# Patient Record
Sex: FEMALE | Race: BLACK OR AFRICAN AMERICAN | NOT HISPANIC OR LATINO | Employment: UNEMPLOYED | URBAN - METROPOLITAN AREA
[De-identification: names, ages, dates, MRNs, and addresses within clinical notes are randomized per-mention and may not be internally consistent; named-entity substitution may affect disease eponyms.]

---

## 2023-02-22 ENCOUNTER — HOSPITAL ENCOUNTER (EMERGENCY)
Facility: HOSPITAL | Age: 28
Discharge: HOME | End: 2023-02-22
Attending: EMERGENCY MEDICINE
Payer: MEDICAID

## 2023-02-22 VITALS
TEMPERATURE: 98 F | BODY MASS INDEX: 29.41 KG/M2 | HEIGHT: 67 IN | WEIGHT: 187.38 LBS | SYSTOLIC BLOOD PRESSURE: 129 MMHG | HEART RATE: 70 BPM | RESPIRATION RATE: 18 BRPM | OXYGEN SATURATION: 100 % | DIASTOLIC BLOOD PRESSURE: 72 MMHG

## 2023-02-22 DIAGNOSIS — R51.9 ACUTE NONINTRACTABLE HEADACHE, UNSPECIFIED HEADACHE TYPE: Primary | ICD-10-CM

## 2023-02-22 LAB
B-HCG UR QL: NEGATIVE
CTP QC/QA: YES

## 2023-02-22 PROCEDURE — 81025 URINE PREGNANCY TEST: CPT | Performed by: EMERGENCY MEDICINE

## 2023-02-22 PROCEDURE — 99282 EMERGENCY DEPT VISIT SF MDM: CPT

## 2023-02-22 PROCEDURE — 25000003 PHARM REV CODE 250: Performed by: PHYSICIAN ASSISTANT

## 2023-02-22 RX ORDER — ACETAMINOPHEN 500 MG
1000 TABLET ORAL
Status: COMPLETED | OUTPATIENT
Start: 2023-02-22 | End: 2023-02-22

## 2023-02-22 RX ADMIN — ACETAMINOPHEN 1000 MG: 500 TABLET, FILM COATED ORAL at 08:02

## 2023-02-23 NOTE — ED PROVIDER NOTES
Encounter Date: 2/22/2023       History     Chief Complaint   Patient presents with    Headache     Pt reports frontal HA x 3 days. Denies N/V, blurry vision. Denies taking anything for pain.      27-year-old female presents to ED complaining of 3 day history of frontal headache.  Headache wraps around to her posterior parietal scalp bilaterally.  She admits to associated eye pressure.  She denies history of similar headaches.  Denies frequent headaches.  No trauma.  No fever.  No recent illness.  No cough.  No lightheadedness.  No weakness.  No nausea vomiting.  No neck pain or stiffness.  Symptoms are acute, constant, mild.  No medications taken prior to arrival.  No exacerbating factors.    Denies any significant past medical history    Review of patient's allergies indicates:  No Known Allergies  No past medical history on file.  No past surgical history on file.  No family history on file.     Review of Systems   Constitutional:  Negative for fever.   Eyes:  Negative for photophobia and visual disturbance.   Respiratory:  Negative for cough.    Gastrointestinal:  Negative for nausea and vomiting.   Musculoskeletal:  Negative for neck pain and neck stiffness.   Neurological:  Positive for headaches. Negative for syncope, weakness and light-headedness.     Physical Exam     Initial Vitals [02/22/23 1805]   BP Pulse Resp Temp SpO2   129/80 74 18 98.5 °F (36.9 °C) 100 %      MAP       --         Physical Exam    Nursing note and vitals reviewed.  Constitutional: She appears well-developed and well-nourished. She is not diaphoretic. No distress.   HENT:   Head: Normocephalic and atraumatic.   Eyes: Conjunctivae and EOM are normal. Pupils are equal, round, and reactive to light.   Neck: Neck supple.   Normal range of motion.  Pulmonary/Chest: No respiratory distress.   Musculoskeletal:      Cervical back: Normal range of motion and neck supple.     Neurological: She is alert and oriented to person, place, and time.  GCS score is 15. GCS eye subscore is 4. GCS verbal subscore is 5. GCS motor subscore is 6.   Skin: Skin is warm. Capillary refill takes less than 2 seconds.   Psychiatric: She has a normal mood and affect. Thought content normal.       ED Course   Procedures  Labs Reviewed   POCT URINE PREGNANCY          Imaging Results    None          Medications   acetaminophen tablet 1,000 mg (has no administration in time range)     Medical Decision Making:   Differential Diagnosis:   Headache disorder, migraine, viral illness  Clinical Tests:   Lab Tests: Ordered and Reviewed  ED Management:  Patient does not have any nuchal rigidity.  Patient is nontoxic appearing, does not have a fever. There is no meningismus. Headache is focused over her forehead, wraps around to bilateral posterior parietal scalp.  Patient does not have any vision problems or neurologic deficits by history or by physical exam.  I do not suspect acute life-threatening process today.  My goal is to get patient's headache under control and have follow up with a local primary care physician for further evaluation and management.  Patient has not taken any medications for her headache. I have given her Tylenol. I will d/c her with instructions to continue the same prn.  I believe the rest for management can be followed up as an outpatient with PCP. Patient ambulated without difficulty out of the emergency department.She was advised to return for any new or worsening symptoms such as fever, neck stiffness, confusion, double vision, or loss of balance, etc.                         Clinical Impression:   Final diagnoses:  [R51.9] Acute nonintractable headache, unspecified headache type (Primary)        ED Disposition Condition    Discharge Stable          ED Prescriptions    None       Follow-up Information       Follow up With Specialties Details Why Contact Info    [facility]  Schedule an appointment as soon as possible for a visit  To  establish primary care physician, for reevaluation [address]  [place] [address]  [phone]               Zaki Dudley PA-C  02/22/23 1952

## 2023-02-23 NOTE — ED TRIAGE NOTES
Pt presents to ED c/o headache x 3 days.  Denies any other symptoms.  Denies taking medications for symptoms.

## 2023-06-07 ENCOUNTER — HOSPITAL ENCOUNTER (EMERGENCY)
Facility: HOSPITAL | Age: 28
Discharge: HOME | End: 2023-06-07
Attending: EMERGENCY MEDICINE
Payer: MEDICAID

## 2023-06-07 VITALS
HEART RATE: 80 BPM | WEIGHT: 187 LBS | SYSTOLIC BLOOD PRESSURE: 123 MMHG | TEMPERATURE: 99 F | DIASTOLIC BLOOD PRESSURE: 57 MMHG | BODY MASS INDEX: 31.16 KG/M2 | RESPIRATION RATE: 18 BRPM | OXYGEN SATURATION: 100 % | HEIGHT: 65 IN

## 2023-06-07 DIAGNOSIS — Z3A.01 LESS THAN 8 WEEKS GESTATION OF PREGNANCY: ICD-10-CM

## 2023-06-07 DIAGNOSIS — O26.891 ABDOMINAL PAIN DURING PREGNANCY IN FIRST TRIMESTER: Primary | ICD-10-CM

## 2023-06-07 DIAGNOSIS — R10.9 ABDOMINAL PAIN DURING PREGNANCY IN FIRST TRIMESTER: Primary | ICD-10-CM

## 2023-06-07 LAB
ABO + RH BLD: NORMAL
ALBUMIN SERPL BCP-MCNC: 4.4 G/DL (ref 3.5–5.2)
ALP SERPL-CCNC: 96 U/L (ref 55–135)
ALT SERPL W/O P-5'-P-CCNC: 14 U/L (ref 10–44)
ANION GAP SERPL CALC-SCNC: 10 MMOL/L (ref 8–16)
AST SERPL-CCNC: 18 U/L (ref 10–40)
B-HCG UR QL: POSITIVE
BASOPHILS # BLD AUTO: 0.03 K/UL (ref 0–0.2)
BASOPHILS NFR BLD: 0.4 % (ref 0–1.9)
BILIRUB SERPL-MCNC: 0.1 MG/DL (ref 0.1–1)
BILIRUB UR QL STRIP: NEGATIVE
BUN SERPL-MCNC: 11 MG/DL (ref 6–20)
CALCIUM SERPL-MCNC: 10.1 MG/DL (ref 8.7–10.5)
CHLORIDE SERPL-SCNC: 102 MMOL/L (ref 95–110)
CLARITY UR: CLEAR
CO2 SERPL-SCNC: 23 MMOL/L (ref 23–29)
COLOR UR: COLORLESS
CREAT SERPL-MCNC: 0.8 MG/DL (ref 0.5–1.4)
CTP QC/QA: YES
DIFFERENTIAL METHOD: ABNORMAL
EOSINOPHIL # BLD AUTO: 0.1 K/UL (ref 0–0.5)
EOSINOPHIL NFR BLD: 0.7 % (ref 0–8)
ERYTHROCYTE [DISTWIDTH] IN BLOOD BY AUTOMATED COUNT: 13.4 % (ref 11.5–14.5)
EST. GFR  (NO RACE VARIABLE): >60 ML/MIN/1.73 M^2
GLUCOSE SERPL-MCNC: 80 MG/DL (ref 70–110)
GLUCOSE UR QL STRIP: NEGATIVE
HCG INTACT+B SERPL-ACNC: 3746 MIU/ML
HCT VFR BLD AUTO: 38.8 % (ref 37–48.5)
HGB BLD-MCNC: 13.1 G/DL (ref 12–16)
HGB UR QL STRIP: NEGATIVE
IMM GRANULOCYTES # BLD AUTO: 0.02 K/UL (ref 0–0.04)
IMM GRANULOCYTES NFR BLD AUTO: 0.2 % (ref 0–0.5)
KETONES UR QL STRIP: NEGATIVE
LEUKOCYTE ESTERASE UR QL STRIP: NEGATIVE
LYMPHOCYTES # BLD AUTO: 3 K/UL (ref 1–4.8)
LYMPHOCYTES NFR BLD: 37.4 % (ref 18–48)
MCH RBC QN AUTO: 26.7 PG (ref 27–31)
MCHC RBC AUTO-ENTMCNC: 33.8 G/DL (ref 32–36)
MCV RBC AUTO: 79 FL (ref 82–98)
MONOCYTES # BLD AUTO: 0.6 K/UL (ref 0.3–1)
MONOCYTES NFR BLD: 7.5 % (ref 4–15)
NEUTROPHILS # BLD AUTO: 4.4 K/UL (ref 1.8–7.7)
NEUTROPHILS NFR BLD: 53.8 % (ref 38–73)
NITRITE UR QL STRIP: NEGATIVE
NRBC BLD-RTO: 0 /100 WBC
PH UR STRIP: 6 [PH] (ref 5–8)
PLATELET # BLD AUTO: 234 K/UL (ref 150–450)
PMV BLD AUTO: 10.2 FL (ref 9.2–12.9)
POTASSIUM SERPL-SCNC: 4 MMOL/L (ref 3.5–5.1)
PROT SERPL-MCNC: 8.6 G/DL (ref 6–8.4)
PROT UR QL STRIP: NEGATIVE
RBC # BLD AUTO: 4.9 M/UL (ref 4–5.4)
SODIUM SERPL-SCNC: 135 MMOL/L (ref 136–145)
SP GR UR STRIP: 1.01 (ref 1–1.03)
URN SPEC COLLECT METH UR: ABNORMAL
UROBILINOGEN UR STRIP-ACNC: NEGATIVE EU/DL
WBC # BLD AUTO: 8.12 K/UL (ref 3.9–12.7)

## 2023-06-07 PROCEDURE — 96360 HYDRATION IV INFUSION INIT: CPT

## 2023-06-07 PROCEDURE — 99285 EMERGENCY DEPT VISIT HI MDM: CPT | Mod: 25

## 2023-06-07 PROCEDURE — 81025 URINE PREGNANCY TEST: CPT | Performed by: EMERGENCY MEDICINE

## 2023-06-07 PROCEDURE — 85025 COMPLETE CBC W/AUTO DIFF WBC: CPT | Performed by: PHYSICIAN ASSISTANT

## 2023-06-07 PROCEDURE — 80053 COMPREHEN METABOLIC PANEL: CPT | Performed by: PHYSICIAN ASSISTANT

## 2023-06-07 PROCEDURE — 25000003 PHARM REV CODE 250: Performed by: PHYSICIAN ASSISTANT

## 2023-06-07 PROCEDURE — 84702 CHORIONIC GONADOTROPIN TEST: CPT | Performed by: PHYSICIAN ASSISTANT

## 2023-06-07 PROCEDURE — 81003 URINALYSIS AUTO W/O SCOPE: CPT | Performed by: EMERGENCY MEDICINE

## 2023-06-07 PROCEDURE — 86900 BLOOD TYPING SEROLOGIC ABO: CPT | Performed by: PHYSICIAN ASSISTANT

## 2023-06-07 RX ORDER — ONDANSETRON 2 MG/ML
8 INJECTION INTRAMUSCULAR; INTRAVENOUS
Status: DISCONTINUED | OUTPATIENT
Start: 2023-06-07 | End: 2023-06-08 | Stop reason: HOSPADM

## 2023-06-07 RX ORDER — ACETAMINOPHEN 500 MG
1000 TABLET ORAL
Status: DISCONTINUED | OUTPATIENT
Start: 2023-06-07 | End: 2023-06-08 | Stop reason: HOSPADM

## 2023-06-07 RX ADMIN — SODIUM CHLORIDE 1000 ML: 9 INJECTION, SOLUTION INTRAVENOUS at 07:06

## 2023-06-07 NOTE — ED TRIAGE NOTES
Pt presents to ED via POV with c/o lower abd pain since 5/30. States pain is worse with intercourse and heavy lifting. Also having dysuria and back pain . Denies vaginal bleeding/discharge. Denies fever. No tx used.

## 2023-06-08 NOTE — DISCHARGE INSTRUCTIONS
Mèsi paske w te chino nan Depatman Ijans nou an shazia a. Li enpòtan storm w sonje ke kèk pwoblèm yo difisil storm dyagnostike epi yo ka pa jwenn nan premye vizit ou a. Asire w ou fè swivi love cohn swen prensipal ou.  Si ou foster pierre, ou bob pierre ki endike arthur papye egzeyat ou a oswa ou ka aguila [facility] [phone] storm pran yon godfrey pierre.    Retounen nan ER la ak nenpòt kesyon/enkyetid, nouvo/moiséssèncarleen sentòm, rocky pi grav oswa echèk storm amelyore. Foster suarez oswa pran okenn desizyon enpòtan storm 24 èdtan si ou te resevwa boy medikaman storm doulè, sedatif oswa dwòg king simpson pandan vizit ER ou.

## 2023-06-08 NOTE — ED PROVIDER NOTES
Encounter Date: 6/7/2023    SCRIBE #1 NOTE: IRenetta, husam scribing for, and in the presence of,  Kofi Merrill PA-C. I have scribed the following portions of the note - Other sections scribed: HPI, ROS.     History     Chief Complaint   Patient presents with    Abdominal Pain     Pt reports lower abdominal pain since 5/30. Reports LMP April 30th. Denies N/V/D. Denies urinary s/s or vaginal bleeding.      Moraima Gao is a 27 y.o. female, with no pertinent PMHx, who presents to the ED with lower abdominal pain for 8 days. Patient reports her last menstrual cycle was on April 30, 2023. Patient denies taking any medication for the pain. Denies nausea, vomiting, diarrhea, vaginal bleeding, dysuria, or other associated symptoms.       The history is provided by the patient. A  was used ([id number]).   Review of patient's allergies indicates:  No Known Allergies  History reviewed. No pertinent past medical history.  History reviewed. No pertinent surgical history.  History reviewed. No pertinent family history.  Social History     Tobacco Use    Smoking status: Never    Smokeless tobacco: Never   Substance Use Topics    Alcohol use: Not Currently    Drug use: Never     Review of Systems   Constitutional:  Negative for fever.   HENT:  Negative for congestion, sore throat and trouble swallowing.    Respiratory:  Negative for cough and shortness of breath.    Cardiovascular:  Negative for chest pain.   Gastrointestinal:  Positive for abdominal pain (Lower). Negative for constipation, diarrhea, nausea and vomiting.   Genitourinary:  Negative for dysuria, flank pain, frequency, urgency and vaginal bleeding.   Musculoskeletal:  Negative for back pain.   Skin:  Negative for rash.   Neurological:  Negative for headaches.   All other systems reviewed and are negative.    Physical Exam     Initial Vitals   BP Pulse Resp Temp SpO2   06/07/23 1837 06/07/23 1837 06/07/23 1837 06/07/23 1923 06/07/23 1837    (!) 142/75 90 18 99 °F (37.2 °C) 100 %      MAP       --                Physical Exam    Nursing note and vitals reviewed.  Constitutional: She appears well-developed and well-nourished. She is not diaphoretic. No distress.   HENT:   Head: Atraumatic.   Right Ear: External ear normal.   Left Ear: External ear normal.   Eyes: Conjunctivae and EOM are normal.   Neck: No tracheal deviation present.   Normal range of motion.  Cardiovascular:  Normal rate and regular rhythm.           Pulmonary/Chest: No accessory muscle usage or stridor. No tachypnea. No respiratory distress.   Abdominal: Abdomen is soft. She exhibits no distension. There is no abdominal tenderness. There is no guarding.   Musculoskeletal:         General: No edema. Normal range of motion.      Cervical back: Normal range of motion.     Neurological: She is alert and oriented to person, place, and time. She displays no tremor. She displays no seizure activity. Coordination and gait normal.   Skin: Skin is intact. Capillary refill takes less than 2 seconds. No rash noted. No erythema.       ED Course   Procedures  Labs Reviewed   URINALYSIS, REFLEX TO URINE CULTURE - Abnormal; Notable for the following components:       Result Value    Color, UA Colorless (*)     All other components within normal limits    Narrative:     Specimen Source->Urine   COMPREHENSIVE METABOLIC PANEL - Abnormal; Notable for the following components:    Sodium 135 (*)     Total Protein 8.6 (*)     All other components within normal limits    Narrative:     Release to patient->Immediate   CBC W/ AUTO DIFFERENTIAL - Abnormal; Notable for the following components:    MCV 79 (*)     MCH 26.7 (*)     All other components within normal limits    Narrative:     Release to patient->Immediate   POCT URINE PREGNANCY - Abnormal; Notable for the following components:    POC Preg Test, Ur Positive (*)     All other components within normal limits   HCG, QUANTITATIVE    Narrative:      Release to patient->Immediate   GROUP & RH          Imaging Results              US OB <14 Wks TransAbd & TransVag, Single Gestation (XPD) (Final result)  Result time 06/07/23 21:34:21   Procedure changed from US OB Transvaginal     Final result by Pablo Beltran MD (06/07/23 21:34:21)                   Impression:      Small gestational sac within the uterine cavity which would correspond to an approximately 5 weeks and 2 days gestation.  No discrete fetal pole identified at this time likely relating to early gestational state.  Continued close obstetric and sonographic follow-up advised.      Electronically signed by: Pablo Beltran MD  Date:    06/07/2023  Time:    21:34               Narrative:    EXAMINATION:  US OB <14 WEEKS, TRANSABDOM & TRANSVAG, SINGLE GESTATION (XPD)    CLINICAL HISTORY:  EP?;    TECHNIQUE:  Transabdominal sonography of the pelvis was performed, followed by transvaginal sonography to better evaluate the uterus and ovaries.    COMPARISON:  None.    FINDINGS:  The uterus measures 9.6 x 5.7 x 6.8 cm. Uterine parenchyma is homogeneous. In the uterine cavity there is a gestational sac with a mean diameter of 0.7 cm.  This would correspond to an approximately 5 weeks 2 days gestation.  There is a yolk sac present which measures 0.1 cm.  No fetal pole identified at this time.    The right ovary measures 4.9 x 3.1 x 2.7 cm. The left ovary measures 3.2 x 1.3 x 1.9 cm. Arterial and venous flow are preserved bilaterally.  A suspected corpus luteum cyst measuring 1.7 x 1.8 x 1.8 cm is seen in the right ovary.    No significant volume of free fluid is seen.                                       Medications   ondansetron injection 8 mg (8 mg Intravenous Not Given 6/7/23 1930)   acetaminophen tablet 1,000 mg (1,000 mg Oral Not Given 6/7/23 1930)   sodium chloride 0.9% bolus 1,000 mL 1,000 mL (0 mLs Intravenous Stopped 6/7/23 2052)     Medical Decision Making:   History:   Old Medical Records: I  decided to obtain old medical records.  Clinical Tests:   Lab Tests: Ordered and Reviewed  Radiological Study: Ordered and Reviewed  ED Management:    This is an emergent evaluation of a 27 y.o. female, gravid, presenting to the ED for abd pain. Patient is non-toxic appearing and in no acute distress. Hemodynamically stable at this time. Will initiate EP workup while monitoring patient and treating symptoms.     Transvaginal US shows IUP that makes EP/heterotopic pregnancy very unlikely. No prior beta-hCG available for comparison. Rh (+). No severe anemia or significant metabolic/eletrolyte disturbance. No convincing evidence for acute UTI. Given the above, I have also considered but doubt pyelonephritis, ovarian torsion, ovarian cyst rupture, appendicitis, and PID. Denies vaginal bleeding    Patient controlled in ED. Remains hemodynamically stable and overall well appearing. Instructed to have prompt follow up with OBGYN for reevaluation and management of her symptoms.     I discussed with the patient the diagnosis, treatment plan, indications for return to the emergency department, and for expected follow-up. The patient verbalized an understanding. The patient is asked if there are any questions or concerns. We discuss the case, until all issues are addressed to the patient's satisfaction. Patient understands and is agreeable to the plan.           Scribe Attestation:   Scribe #1: I performed the above scribed service and the documentation accurately describes the services I performed. I attest to the accuracy of the note.                 I, Kofi Merrill PA-C, personally performed the services described in this documentation. All medical record entries made by the scribe were at my direction and in my presence. I have reviewed the chart and agree that the record reflects my personal performance and is accurate and complete.    Clinical Impression:   Final diagnoses:  [O26.891, R10.9] Abdominal pain during  pregnancy in first trimester (Primary)  [Z3A.01] Less than 8 weeks gestation of pregnancy        ED Disposition Condition    Discharge Stable          ED Prescriptions    None       Follow-up Information       Follow up With Specialties Details Why Contact Info    Golden Perez MD Obstetrics and Gynecology Schedule an appointment as soon as possible for a visit in 1 day For further evaluation, For more definitive management of your symptoms [address]  [address]  [facility] [address]  [phone]      [facility] Emergency Dept Emergency Medicine Go to  If symptoms worsen [address] [facility] [address] [phone]             Kofi Merrill PA-C  06/07/23 8331

## 2023-07-09 ENCOUNTER — HOSPITAL ENCOUNTER (EMERGENCY)
Facility: HOSPITAL | Age: 28
Discharge: HOME | End: 2023-07-09
Attending: EMERGENCY MEDICINE
Payer: MEDICAID

## 2023-07-09 VITALS
HEART RATE: 75 BPM | RESPIRATION RATE: 18 BRPM | WEIGHT: 187 LBS | OXYGEN SATURATION: 100 % | SYSTOLIC BLOOD PRESSURE: 122 MMHG | BODY MASS INDEX: 31.16 KG/M2 | HEIGHT: 65 IN | TEMPERATURE: 99 F | DIASTOLIC BLOOD PRESSURE: 65 MMHG

## 2023-07-09 DIAGNOSIS — O03.4 INCOMPLETE MISCARRIAGE: Primary | ICD-10-CM

## 2023-07-09 LAB
ALBUMIN SERPL BCP-MCNC: 4 G/DL (ref 3.5–5.2)
ALP SERPL-CCNC: 71 U/L (ref 55–135)
ALT SERPL W/O P-5'-P-CCNC: 17 U/L (ref 10–44)
ANION GAP SERPL CALC-SCNC: 7 MMOL/L (ref 8–16)
AST SERPL-CCNC: 19 U/L (ref 10–40)
BASOPHILS # BLD AUTO: 0.03 K/UL (ref 0–0.2)
BASOPHILS NFR BLD: 0.6 % (ref 0–1.9)
BILIRUB SERPL-MCNC: 0.3 MG/DL (ref 0.1–1)
BILIRUB UR QL STRIP: NEGATIVE
BUN SERPL-MCNC: 11 MG/DL (ref 6–20)
CALCIUM SERPL-MCNC: 9.5 MG/DL (ref 8.7–10.5)
CHLORIDE SERPL-SCNC: 106 MMOL/L (ref 95–110)
CLARITY UR: ABNORMAL
CO2 SERPL-SCNC: 24 MMOL/L (ref 23–29)
COLOR UR: YELLOW
CREAT SERPL-MCNC: 0.8 MG/DL (ref 0.5–1.4)
DIFFERENTIAL METHOD: ABNORMAL
EOSINOPHIL # BLD AUTO: 0.1 K/UL (ref 0–0.5)
EOSINOPHIL NFR BLD: 1 % (ref 0–8)
ERYTHROCYTE [DISTWIDTH] IN BLOOD BY AUTOMATED COUNT: 13 % (ref 11.5–14.5)
EST. GFR  (NO RACE VARIABLE): >60 ML/MIN/1.73 M^2
GLUCOSE SERPL-MCNC: 89 MG/DL (ref 70–110)
GLUCOSE UR QL STRIP: NEGATIVE
HCG INTACT+B SERPL-ACNC: 1035 MIU/ML
HCT VFR BLD AUTO: 39 % (ref 37–48.5)
HGB BLD-MCNC: 12.7 G/DL (ref 12–16)
HGB UR QL STRIP: ABNORMAL
IMM GRANULOCYTES # BLD AUTO: 0.01 K/UL (ref 0–0.04)
IMM GRANULOCYTES NFR BLD AUTO: 0.2 % (ref 0–0.5)
KETONES UR QL STRIP: NEGATIVE
LEUKOCYTE ESTERASE UR QL STRIP: NEGATIVE
LYMPHOCYTES # BLD AUTO: 1.8 K/UL (ref 1–4.8)
LYMPHOCYTES NFR BLD: 38.6 % (ref 18–48)
MCH RBC QN AUTO: 26.7 PG (ref 27–31)
MCHC RBC AUTO-ENTMCNC: 32.6 G/DL (ref 32–36)
MCV RBC AUTO: 82 FL (ref 82–98)
MICROSCOPIC COMMENT: NORMAL
MONOCYTES # BLD AUTO: 0.5 K/UL (ref 0.3–1)
MONOCYTES NFR BLD: 10.5 % (ref 4–15)
NEUTROPHILS # BLD AUTO: 2.3 K/UL (ref 1.8–7.7)
NEUTROPHILS NFR BLD: 49.1 % (ref 38–73)
NITRITE UR QL STRIP: NEGATIVE
NRBC BLD-RTO: 0 /100 WBC
PH UR STRIP: 7 [PH] (ref 5–8)
PLATELET # BLD AUTO: 234 K/UL (ref 150–450)
PMV BLD AUTO: 10.1 FL (ref 9.2–12.9)
POTASSIUM SERPL-SCNC: 3.8 MMOL/L (ref 3.5–5.1)
PROT SERPL-MCNC: 7.8 G/DL (ref 6–8.4)
PROT UR QL STRIP: ABNORMAL
RBC # BLD AUTO: 4.76 M/UL (ref 4–5.4)
RBC #/AREA URNS HPF: 1 /HPF (ref 0–4)
SODIUM SERPL-SCNC: 137 MMOL/L (ref 136–145)
SP GR UR STRIP: 1.02 (ref 1–1.03)
SQUAMOUS #/AREA URNS HPF: 14 /HPF
URN SPEC COLLECT METH UR: ABNORMAL
UROBILINOGEN UR STRIP-ACNC: NEGATIVE EU/DL
WBC # BLD AUTO: 4.77 K/UL (ref 3.9–12.7)
WBC #/AREA URNS HPF: 1 /HPF (ref 0–5)

## 2023-07-09 PROCEDURE — 80053 COMPREHEN METABOLIC PANEL: CPT | Performed by: NURSE PRACTITIONER

## 2023-07-09 PROCEDURE — 81000 URINALYSIS NONAUTO W/SCOPE: CPT | Performed by: NURSE PRACTITIONER

## 2023-07-09 PROCEDURE — 85025 COMPLETE CBC W/AUTO DIFF WBC: CPT | Performed by: NURSE PRACTITIONER

## 2023-07-09 PROCEDURE — 84702 CHORIONIC GONADOTROPIN TEST: CPT | Performed by: NURSE PRACTITIONER

## 2023-07-09 PROCEDURE — 99284 EMERGENCY DEPT VISIT MOD MDM: CPT | Mod: 25

## 2023-07-09 RX ORDER — HYDROCODONE BITARTRATE AND ACETAMINOPHEN 5; 325 MG/1; MG/1
1 TABLET ORAL EVERY 6 HOURS PRN
Qty: 12 TABLET | Refills: 0 | Status: SHIPPED | OUTPATIENT
Start: 2023-07-09 | End: 2023-07-18 | Stop reason: ALTCHOICE

## 2023-07-09 NOTE — ED PROVIDER NOTES
"Encounter Date: 2023    SCRIBE #1 NOTE: I, Judy Spann, am scribing for, and in the presence of,  Pritesh Whiting DNP. I have scribed the entire note.     History     Chief Complaint   Patient presents with    Vaginal Bleeding     Pt reports being 9 weeks pregnant and began having vaginal bleeding yesterday that started as pink "spotting", this morning the vaginal bleeding has been heavier, going through two pads this morning. Pt reports associated lower pelvic cramping. Pt denies blood clots.      Moraima Gao is a 28 y.o. female who presents to the ER with complaint of intermittent vaginal bleeding onset yesterday. Patient is currently pregnant (A0) (LMP: 2023). Patient states the blood was pink yesterday, but this morning it became a heavy red flow. Associated symptoms are abdominal pain (3/10). No other exacerbating or alleviating factors. Patient denies any other symptoms. Patient states her next appointment is on .    The history is provided by the patient. The history is limited by a language barrier ( number: [id number]). A  was used.   Review of patient's allergies indicates:  No Known Allergies  History reviewed. No pertinent past medical history.  History reviewed. No pertinent surgical history.  History reviewed. No pertinent family history.  Social History     Tobacco Use    Smoking status: Never    Smokeless tobacco: Never   Substance Use Topics    Alcohol use: Not Currently    Drug use: Never     Review of Systems   Constitutional:  Negative for chills, fatigue and fever.   HENT:  Negative for congestion, ear discharge, ear pain, postnasal drip, rhinorrhea, sinus pressure, sneezing, sore throat and voice change.    Eyes:  Negative for discharge and itching.   Respiratory:  Negative for cough, shortness of breath and wheezing.    Cardiovascular:  Negative for chest pain, palpitations and leg swelling.   Gastrointestinal:  Positive for abdominal " pain. Negative for constipation, diarrhea, nausea and vomiting.   Endocrine: Negative for polydipsia, polyphagia and polyuria.   Genitourinary:  Positive for vaginal bleeding. Negative for dysuria, frequency, hematuria, urgency, vaginal discharge and vaginal pain.   Musculoskeletal:  Negative for arthralgias and myalgias.   Skin:  Negative for rash and wound.   Neurological:  Negative for dizziness, seizures, syncope, weakness and numbness.   Hematological:  Negative for adenopathy. Does not bruise/bleed easily.   Psychiatric/Behavioral:  Negative for self-injury and suicidal ideas. The patient is not nervous/anxious.      Physical Exam     Initial Vitals [07/09/23 0920]   BP Pulse Resp Temp SpO2   132/72 71 18 98.9 °F (37.2 °C) 100 %      MAP       --         Physical Exam    Nursing note and vitals reviewed.  Constitutional: She appears well-developed and well-nourished.   HENT:   Head: Normocephalic and atraumatic.   Right Ear: External ear normal.   Left Ear: External ear normal.   Nose: Nose normal.   Eyes: Conjunctivae and EOM are normal. Pupils are equal, round, and reactive to light. Right eye exhibits no discharge. Left eye exhibits no discharge.   Neck:   Normal range of motion.  Abdominal: Abdomen is soft. She exhibits no distension. There is no abdominal tenderness.   No right CVA tenderness.  No left CVA tenderness.   Musculoskeletal:         General: Normal range of motion.      Cervical back: Normal range of motion.     Neurological: She is alert and oriented to person, place, and time.   Skin: Skin is dry. Capillary refill takes less than 2 seconds.       ED Course   Procedures  Labs Reviewed   CBC W/ AUTO DIFFERENTIAL - Abnormal; Notable for the following components:       Result Value    MCH 26.7 (*)     All other components within normal limits    Narrative:     Release to patient->Immediate   COMPREHENSIVE METABOLIC PANEL - Abnormal; Notable for the following components:    Anion Gap 7 (*)      All other components within normal limits    Narrative:     Release to patient->Immediate   URINALYSIS, REFLEX TO URINE CULTURE - Abnormal; Notable for the following components:    Appearance, UA Hazy (*)     Protein, UA Trace (*)     Occult Blood UA 3+ (*)     All other components within normal limits    Narrative:     Specimen Source->Urine   HCG, QUANTITATIVE    Narrative:     Release to patient->Immediate   URINALYSIS MICROSCOPIC    Narrative:     Specimen Source->Urine          Imaging Results               US OB <14 Wks TransAbd & TransVag, Single Gestation (XPD) (Final result)  Result time 07/09/23 10:42:59      Final result by Nathan Ball MD (07/09/23 10:42:59)                   Impression:      Intrauterine gestational sac with mean sac diameter of 20 mm, with absence of embryo and cardiac activity.  Findings are diagnostic of pregnancy failure (absence of embryo with heartbeat greater than 11 days after a scan that showed a gestational sac with a yolk sac) in this patient with down trending beta hCG level.  Small volume subchorionic hemorrhage.  Suggest obstetrical evaluation or follow-up as clinically appropriate.    This report was flagged in Epic as abnormal.      Electronically signed by: Nathan Ball MD  Date:    07/09/2023  Time:    10:42               Narrative:    EXAMINATION:  US OB <14 WEEKS, TRANSABDOM & TRANSVAG, SINGLE GESTATION (XPD)    CLINICAL HISTORY:  vag bleeding during early pregnancy; beta hCG on 06/07/2023 was 3746.  Beta hCG on 07/09/2023 is 1035.    TECHNIQUE:  Transabdominal sonography of the pelvis was performed, followed by transvaginal sonography to better evaluate the uterus and ovaries.    COMPARISON:  06/07/2023.    FINDINGS:  The uterus measures 11.1 x 6.2 x 8.3 cm.  Previous ultrasound on 06/07/2023 demonstrated a gestational sac with yolk sac.  On the current study, intrauterine gestational sac is present which measures 20 mm in mean sac diameter.  Embryo and  cardiac activity not identified.  Yolk sac is no longer seen.  This is diagnostic of pregnancy failure (absence of embryo with heartbeat greater than 11 days after a scan that showed a gestational sac with a yolk sac).  There is a small amount subchorionic hemorrhage measuring approximately 1.6 x 0.8 x 1.7 cm.    The ovaries are normal in size and sonographic appearance.  There are normal arterial and venous waveforms in both ovaries.    No significant free fluid is seen in the cul-de-sac.                                       Medications - No data to display  Medical Decision Making:   History:   Old Medical Records: I decided to obtain old medical records.  Initial Assessment:   28 y.o. female presents to the ER for vaginal bleeding. Patient is currently pregnant (LMP: 4/30/2023). On exam, patient had no significant findings. US OB ordered. CBC, CMP, urinalysis, hCG ordered.   Differential Diagnosis:   Includes but is not limited to: Ovarian cyst, Bacterial vaginosis, Candidiasis, Uterine bleeding, Ectopic pregnancy, Miscarriage   Clinical Tests:   Lab Tests: Ordered and Reviewed  Radiological Study: Ordered and Reviewed        Scribe Attestation:   Scribe #1: I performed the above scribed service and the documentation accurately describes the services I performed. I attest to the accuracy of the note.      ED Course as of 07/09/23 1704   Sun Jul 09, 2023   0946 BP: 132/72 [VC]   0946 Temp: 98.9 °F (37.2 °C) [VC]   0946 Temp Source: Oral [VC]   0946 Pulse: 71 [VC]   0946 Resp: 18 [VC]   0946 SpO2: 100 % [VC]   1007 CBC auto differential(!)  Normal cbc. [VC]   1029 Urinalysis Microscopic  Neg for uti. [VC]   1029 Comprehensive metabolic panel(!)  Normal cmp. [VC]   1029 HCG Quant: 1035  Reduced from one month ago. [VC]   1047 US OB <14 Wks TransAbd & TransVag, Single Gestation (XPD)(!)  Intrauterine gestational sac with mean sac diameter of 20 mm, with absence of embryo and cardiac activity.  Findings are  "diagnostic of pregnancy failure (absence of embryo with heartbeat greater than 11 days after a scan that showed a gestational sac with a yolk sac) in this patient with down trending beta hCG level.  Small volume subchorionic hemorrhage.  Suggest obstetrical evaluation or follow-up as clinically appropriate. [VC]      ED Course User Index  [VC] Pritesh Whiting DNP          The patient understands that her decreased hCG as well as absence of cardiac activity indicates likely fetal demise.  She understands the importance of following up with her OBGYN as soon as possible as she may require medications to facilitate expulsion of the fetus.  Pain medication with appropriate drowsy warning provided.         Clinical Impression:   Final diagnoses:  [O03.4] Incomplete miscarriage (Primary)        ED Disposition Condition    Discharge Stable          ED Prescriptions       Medication Sig Dispense Start Date End Date Auth. Provider    HYDROcodone-acetaminophen (NORCO) 5-325 mg per tablet Take 1 tablet by mouth every 6 (six) hours as needed for Pain. 12 tablet 7/9/2023 -- Pritesh Whiting DNP          Follow-up Information       Follow up With Specialties Details Why Contact Info    [facility]  Schedule an appointment as soon as possible for a visit   [address] [place] [address]  [phone]              Scribe attestation: Scribe attestation: I, Pritesh Whiting DNP ACNP-BC FNP-C ENP-C,  personally performed the services described in this documentation. All medical record entries made by the scribe were at my direction and in my presence.  I have reviewed the chart and agree that the record reflects my personal performance and is accurate and complete.      Vital signs at the time of disposition were:  /65 (BP Location: Left arm, Patient Position: Sitting)   Pulse 75   Temp 98.9 °F (37.2 °C) (Oral)   Resp 18   Ht 5' 5" (1.651 m)   Wt 84.8 kg (187 lb)   LMP 04/30/2023 (Exact Date)  "  SpO2 100%   Breastfeeding Unknown   BMI 31.12 kg/m²       See AVS for additional recommendations. Medications listed herein were prescribed after reviewing the patient's allergies, medication list, history, most recent laboratories as available.  Referrals below were provided after reviewing the patient's previous medical providers. She understands she  should return for any worsening or changes in condition.  Prior to discharge the patient was asked if she  had any additional concerns or complaints and she declined. The patient was given an opportunity to ask questions and all were answered to her satisfaction.      Pritesh Whiting, DONNA  07/09/23 7079

## 2023-07-09 NOTE — ED TRIAGE NOTES
Patient to ED with complaints of vaginal bleeding. Reports pink spotting began on yesterday x1 episode but this morning woke up with lower abd cramping and bleeding using two pads this AM. LMP was 4/30/23, has appt for first OB check on 7/29/23.

## 2023-07-18 ENCOUNTER — HOSPITAL ENCOUNTER (EMERGENCY)
Facility: HOSPITAL | Age: 28
Discharge: HOME | End: 2023-07-18
Attending: EMERGENCY MEDICINE
Payer: MEDICAID

## 2023-07-18 VITALS
SYSTOLIC BLOOD PRESSURE: 122 MMHG | HEIGHT: 65 IN | BODY MASS INDEX: 31.16 KG/M2 | DIASTOLIC BLOOD PRESSURE: 64 MMHG | OXYGEN SATURATION: 99 % | HEART RATE: 84 BPM | TEMPERATURE: 99 F | RESPIRATION RATE: 18 BRPM | WEIGHT: 187 LBS

## 2023-07-18 DIAGNOSIS — O03.9 COMPLETE MISCARRIAGE: Primary | ICD-10-CM

## 2023-07-18 DIAGNOSIS — R10.30 LOWER ABDOMINAL PAIN: ICD-10-CM

## 2023-07-18 LAB
ALBUMIN SERPL BCP-MCNC: 4 G/DL (ref 3.5–5.2)
ALP SERPL-CCNC: 97 U/L (ref 55–135)
ALT SERPL W/O P-5'-P-CCNC: 11 U/L (ref 10–44)
ANION GAP SERPL CALC-SCNC: 6 MMOL/L (ref 8–16)
AST SERPL-CCNC: 18 U/L (ref 10–40)
B-HCG UR QL: NEGATIVE
BASOPHILS # BLD AUTO: 0.02 K/UL (ref 0–0.2)
BASOPHILS NFR BLD: 0.5 % (ref 0–1.9)
BILIRUB SERPL-MCNC: 0.2 MG/DL (ref 0.1–1)
BILIRUB UR QL STRIP: NEGATIVE
BUN SERPL-MCNC: 13 MG/DL (ref 6–20)
CALCIUM SERPL-MCNC: 9.9 MG/DL (ref 8.7–10.5)
CHLORIDE SERPL-SCNC: 108 MMOL/L (ref 95–110)
CLARITY UR: CLEAR
CO2 SERPL-SCNC: 26 MMOL/L (ref 23–29)
COLOR UR: YELLOW
CREAT SERPL-MCNC: 0.8 MG/DL (ref 0.5–1.4)
CTP QC/QA: YES
DIFFERENTIAL METHOD: ABNORMAL
EOSINOPHIL # BLD AUTO: 0.1 K/UL (ref 0–0.5)
EOSINOPHIL NFR BLD: 1.7 % (ref 0–8)
ERYTHROCYTE [DISTWIDTH] IN BLOOD BY AUTOMATED COUNT: 13 % (ref 11.5–14.5)
EST. GFR  (NO RACE VARIABLE): >60 ML/MIN/1.73 M^2
GLUCOSE SERPL-MCNC: 87 MG/DL (ref 70–110)
GLUCOSE UR QL STRIP: NEGATIVE
HCG INTACT+B SERPL-ACNC: 11 MIU/ML
HCT VFR BLD AUTO: 39 % (ref 37–48.5)
HGB BLD-MCNC: 12.6 G/DL (ref 12–16)
HGB UR QL STRIP: ABNORMAL
IMM GRANULOCYTES # BLD AUTO: 0.01 K/UL (ref 0–0.04)
IMM GRANULOCYTES NFR BLD AUTO: 0.2 % (ref 0–0.5)
KETONES UR QL STRIP: NEGATIVE
LEUKOCYTE ESTERASE UR QL STRIP: NEGATIVE
LYMPHOCYTES # BLD AUTO: 1.9 K/UL (ref 1–4.8)
LYMPHOCYTES NFR BLD: 44.5 % (ref 18–48)
MCH RBC QN AUTO: 26.5 PG (ref 27–31)
MCHC RBC AUTO-ENTMCNC: 32.3 G/DL (ref 32–36)
MCV RBC AUTO: 82 FL (ref 82–98)
MICROSCOPIC COMMENT: NORMAL
MONOCYTES # BLD AUTO: 0.4 K/UL (ref 0.3–1)
MONOCYTES NFR BLD: 8.6 % (ref 4–15)
NEUTROPHILS # BLD AUTO: 1.9 K/UL (ref 1.8–7.7)
NEUTROPHILS NFR BLD: 44.5 % (ref 38–73)
NITRITE UR QL STRIP: NEGATIVE
NRBC BLD-RTO: 0 /100 WBC
PH UR STRIP: 7 [PH] (ref 5–8)
PLATELET # BLD AUTO: 252 K/UL (ref 150–450)
PMV BLD AUTO: 9.6 FL (ref 9.2–12.9)
POTASSIUM SERPL-SCNC: 4.1 MMOL/L (ref 3.5–5.1)
PROT SERPL-MCNC: 7.8 G/DL (ref 6–8.4)
PROT UR QL STRIP: ABNORMAL
RBC # BLD AUTO: 4.76 M/UL (ref 4–5.4)
RBC #/AREA URNS HPF: 0 /HPF (ref 0–4)
SODIUM SERPL-SCNC: 140 MMOL/L (ref 136–145)
SP GR UR STRIP: 1.03 (ref 1–1.03)
SQUAMOUS #/AREA URNS HPF: 9 /HPF
URN SPEC COLLECT METH UR: ABNORMAL
UROBILINOGEN UR STRIP-ACNC: NEGATIVE EU/DL
WBC # BLD AUTO: 4.2 K/UL (ref 3.9–12.7)
WBC #/AREA URNS HPF: 4 /HPF (ref 0–5)

## 2023-07-18 PROCEDURE — 81000 URINALYSIS NONAUTO W/SCOPE: CPT

## 2023-07-18 PROCEDURE — 85025 COMPLETE CBC W/AUTO DIFF WBC: CPT

## 2023-07-18 PROCEDURE — 84702 CHORIONIC GONADOTROPIN TEST: CPT

## 2023-07-18 PROCEDURE — 25000003 PHARM REV CODE 250

## 2023-07-18 PROCEDURE — 80053 COMPREHEN METABOLIC PANEL: CPT

## 2023-07-18 PROCEDURE — 96360 HYDRATION IV INFUSION INIT: CPT

## 2023-07-18 PROCEDURE — 81025 URINE PREGNANCY TEST: CPT

## 2023-07-18 PROCEDURE — 99285 EMERGENCY DEPT VISIT HI MDM: CPT | Mod: 25

## 2023-07-18 RX ORDER — ONDANSETRON 4 MG/1
4 TABLET, ORALLY DISINTEGRATING ORAL EVERY 6 HOURS PRN
Qty: 15 TABLET | Refills: 0 | Status: SHIPPED | OUTPATIENT
Start: 2023-07-18

## 2023-07-18 RX ORDER — NAPROXEN 500 MG/1
500 TABLET ORAL 2 TIMES DAILY PRN
Qty: 30 TABLET | Refills: 0 | OUTPATIENT
Start: 2023-07-18 | End: 2024-02-13

## 2023-07-18 RX ORDER — OXYCODONE HYDROCHLORIDE 5 MG/1
5 TABLET ORAL EVERY 4 HOURS PRN
Qty: 12 TABLET | Refills: 0 | Status: SHIPPED | OUTPATIENT
Start: 2023-07-18

## 2023-07-18 RX ADMIN — SODIUM CHLORIDE 1000 ML: 9 INJECTION, SOLUTION INTRAVENOUS at 11:07

## 2023-07-18 NOTE — DISCHARGE INSTRUCTIONS

## 2023-07-18 NOTE — ED TRIAGE NOTES
Lower abd and lower back pain,pt also c/o of headache and nausea.vaginal bleeding stopped on Sunday.Miscarriage on 7-9-23

## 2023-07-18 NOTE — ED PROVIDER NOTES
Encounter Date: 2023    SCRIBE #1 NOTE: I, Randal Hall, am scribing for, and in the presence of,  Alayna Holdsworth, PA. I have scribed the following portions of the note - Other sections scribed: HPI, ROS.     History     Chief Complaint   Patient presents with    Abdominal Pain     Pt reports lower abdominal pain x  and HA. Reports having miscarriage on , denies bleeding. Denies N/V or urinary s/s.      CC: Abdominal pain    HPI: Moraima Gao is a 28 y.o. female who presents to the ED with her  for evaluation of intermittent lower abdominal pain onset 9 days ago. Pt describes pain as 7/10 and is aggravated with movement but denies taking any medications for her pain. Pt complains of associated symptoms include headache, nausea, and back pain. Pt reports she had an incomplete miscarriage on  via US and notes her vaginal bleeding had stopped 2 days ago. Pt states this was her first miscarriage and she has one living child. A1. Pt denies seeing OB since her miscarriage but notes she has an appt coming up on . Pt denies chest pain, SOB, vomiting, diarrhea, or other associated symptoms. Pt denies any PMHx or known allergies.      The history is provided by the patient. A  was used (ID: [id number]).   Review of patient's allergies indicates:  No Known Allergies  History reviewed. No pertinent past medical history.  History reviewed. No pertinent surgical history.  History reviewed. No pertinent family history.  Social History     Tobacco Use    Smoking status: Never    Smokeless tobacco: Never   Substance Use Topics    Alcohol use: Not Currently    Drug use: Never     Review of Systems   Constitutional:  Negative for chills, diaphoresis and fever.   HENT:  Negative for sore throat.    Respiratory:  Negative for shortness of breath.    Cardiovascular:  Negative for chest pain.   Gastrointestinal:  Positive for abdominal pain (lower) and nausea. Negative  for constipation, diarrhea and vomiting.   Genitourinary:  Negative for decreased urine volume, difficulty urinating, dyspareunia, dysuria, frequency, hematuria, urgency, vaginal bleeding, vaginal discharge and vaginal pain.   Musculoskeletal:  Positive for back pain (lower). Negative for myalgias.   Skin:  Negative for rash.   Neurological:  Positive for headaches. Negative for dizziness, weakness and light-headedness.   All other systems reviewed and are negative.    Physical Exam     Initial Vitals [07/18/23 1103]   BP Pulse Resp Temp SpO2   (!) 119/59 89 16 99 °F (37.2 °C) 100 %      MAP       --         Physical Exam    Nursing note and vitals reviewed.  Constitutional: She appears well-developed and well-nourished. She is not diaphoretic. She is active. She does not appear ill. No distress.   HENT:   Head: Normocephalic and atraumatic.   Right Ear: External ear normal.   Left Ear: External ear normal.   Nose: Nose normal.   Eyes: Conjunctivae, EOM and lids are normal. Pupils are equal, round, and reactive to light. Right eye exhibits no discharge. Left eye exhibits no discharge.   Neck: Phonation normal. Neck supple.   Normal range of motion.  Cardiovascular:  Normal rate and regular rhythm.           Pulmonary/Chest: Effort normal and breath sounds normal. No respiratory distress.   Abdominal: Abdomen is soft. She exhibits no distension. There is abdominal tenderness in the suprapubic area. There is no rebound and no guarding.   Musculoskeletal:         General: Normal range of motion.      Cervical back: Normal range of motion and neck supple.     Neurological: She is alert and oriented to person, place, and time. She has normal strength. No sensory deficit. GCS eye subscore is 4. GCS verbal subscore is 5. GCS motor subscore is 6.   Skin: Skin is dry. Capillary refill takes less than 2 seconds.       ED Course   Procedures  Labs Reviewed   URINALYSIS, REFLEX TO URINE CULTURE - Abnormal; Notable for the  following components:       Result Value    Protein, UA Trace (*)     Occult Blood UA 3+ (*)     All other components within normal limits    Narrative:     Specimen Source->Urine   CBC W/ AUTO DIFFERENTIAL - Abnormal; Notable for the following components:    MCH 26.5 (*)     All other components within normal limits    Narrative:     Release to patient->Immediate   COMPREHENSIVE METABOLIC PANEL - Abnormal; Notable for the following components:    Anion Gap 6 (*)     All other components within normal limits    Narrative:     Release to patient->Immediate  Collection has been rescheduled by [email or web address] at 07/18/2023 11:37 Reason:   Nurse draw   HCG, QUANTITATIVE    Narrative:     Release to patient->Immediate   URINALYSIS MICROSCOPIC    Narrative:     Specimen Source->Urine   POCT URINE PREGNANCY          Imaging Results              US Pelvis Comp with Transvag NON-OB (xpd) (Final result)  Result time 07/18/23 12:37:28   Procedure changed from US OB <14 Wks TransAbd & TransVag, Single Gestation (XPD)     Final result by Manuel Arias MD (07/18/23 12:37:28)                   Impression:      Unremarkable pelvic ultrasound examination.      Electronically signed by: Manuel Arias MD  Date:    07/18/2023  Time:    12:37               Narrative:    EXAMINATION:  US PELVIS COMP WITH TRANSVAG NON-OB (XPD)    CLINICAL HISTORY:  Recent incomplete miscarriage, lower abdominal pain;    TECHNIQUE:  Transabdominal sonography of the pelvis was performed, followed by transvaginal sonography to better evaluate the uterus and ovaries.    COMPARISON:  None.    FINDINGS:  Uterus:    Size: 9.0 x 5.3 x 6.9 cm    Masses: None    Endometrium: Normal in thickness in this pre menopausal patient, measuring 5.3 mm.  There is a small amount of fluid within the endometrial cavity which can be a normal finding.    Right ovary:    Size: 3.1 x 1.6 x 2.4 cm    Appearance: Normal    Vascular flow: Normal.    Left ovary:    Size: 3.2 x 1.9 x 3.3  cm    Appearance: Normal    Vascular Flow: Normal.    Free Fluid:    None.                                       Medications   sodium chloride 0.9% bolus 1,000 mL 1,000 mL (0 mLs Intravenous Stopped 7/18/23 1257)     Medical Decision Making:   History:   Old Medical Records: I decided to obtain old medical records.  Initial Assessment:   28 y.o. female who presents to the ED with her  for evaluation of intermittent lower abdominal pain.  Patient's chart and medical history reviewed.  Differential Diagnosis:   UTI  Gonorrhea  Chlamydia  Trichomonas  Vaginal yeast infection  Bacterial vaginosis  PID  Pregnancy  Ectopic pregnancy  Ovarian torsion  Incomplete miscarriage  Complete miscarriage   Clinical Tests:   Lab Tests: Ordered and Reviewed  Radiological Study: Reviewed and Ordered  ED Management:  Patient's vitals reviewed.  She is afebrile, no respiratory distress, nontoxic-appearing in the ED. Patient had suprapubic tenderness to palpation.  Patient started on fluids.  Patient denied pain or nausea medication.  UPT was negative today. CBC and CMP was unremarkable. HCG is 11 today. UA unremarkable for infection. US showed Unremarkable pelvic ultrasound examination.  Patient states she is feeling better.  Discussed with patient this is a complete miscarriage and the pain can continue after this.  Discussed with patient to follow-up with her OBGYN.  Patient will be sent home with oxycodone, naproxen, and Zofran for symptomatic control. I have reviewed the  for this patient.  Instructed patient to rest and hydrate, she verbalized understanding. Patient agrees with this plan. Discussed with her strict return precautions, she verbalized understanding. Patient is stable for discharge.           Scribe Attestation:   Scribe #1: I performed the above scribed service and the documentation accurately describes the services I performed. I attest to the accuracy of the note.                 Scribe attestation: I,  Alayna Holdsworth,PA-C, personally performed the services described in this documentation.  All medical record entries made by the scribe were at my direction and in my presence.  I have reviewed the chart and agree that the record reflects my personal performance and is accurate and complete.    Clinical Impression:   Final diagnoses:  [O03.9] Complete miscarriage (Primary)  [R10.30] Lower abdominal pain        ED Disposition Condition    Discharge Stable          ED Prescriptions       Medication Sig Dispense Start Date End Date Auth. Provider    oxyCODONE (ROXICODONE) 5 MG immediate release tablet Take 1 tablet (5 mg total) by mouth every 4 (four) hours as needed for Pain. 12 tablet 7/18/2023 -- Alayna Holdsworth, PA-C    naproxen (NAPROSYN) 500 MG tablet Take 1 tablet (500 mg total) by mouth 2 (two) times daily as needed (Pain). 30 tablet 7/18/2023 -- Alayna Holdsworth, PA-C    ondansetron (ZOFRAN-ODT) 4 MG TbDL Take 1 tablet (4 mg total) by mouth every 6 (six) hours as needed (Nausea). 15 tablet 7/18/2023 -- Alayna Holdsworth, PA-C          Follow-up Information       Follow up With Specialties Details Why Contact Info    Your OBGYN                 Alayna Holdsworth, PA-C  07/18/23 9398

## 2024-01-31 DIAGNOSIS — Z34.81 PRENATAL CARE, SUBSEQUENT PREGNANCY IN FIRST TRIMESTER: Primary | ICD-10-CM

## 2024-02-04 ENCOUNTER — HOSPITAL ENCOUNTER (EMERGENCY)
Facility: HOSPITAL | Age: 29
Discharge: HOME | End: 2024-02-04
Attending: EMERGENCY MEDICINE
Payer: MEDICAID

## 2024-02-04 VITALS
SYSTOLIC BLOOD PRESSURE: 131 MMHG | HEIGHT: 65 IN | HEART RATE: 85 BPM | BODY MASS INDEX: 31.12 KG/M2 | OXYGEN SATURATION: 100 % | TEMPERATURE: 99 F | DIASTOLIC BLOOD PRESSURE: 62 MMHG | RESPIRATION RATE: 16 BRPM

## 2024-02-04 DIAGNOSIS — O20.9 VAGINAL BLEEDING AFFECTING EARLY PREGNANCY: Primary | ICD-10-CM

## 2024-02-04 LAB
ABO + RH BLD: NORMAL
ALBUMIN SERPL BCP-MCNC: 4 G/DL (ref 3.5–5.2)
ALP SERPL-CCNC: 64 U/L (ref 55–135)
ALT SERPL W/O P-5'-P-CCNC: 16 U/L (ref 10–44)
ANION GAP SERPL CALC-SCNC: 11 MMOL/L (ref 8–16)
AST SERPL-CCNC: 23 U/L (ref 10–40)
B-HCG UR QL: POSITIVE
BACTERIA #/AREA URNS HPF: NORMAL /HPF
BASOPHILS # BLD AUTO: 0.01 K/UL (ref 0–0.2)
BASOPHILS NFR BLD: 0.2 % (ref 0–1.9)
BILIRUB SERPL-MCNC: 0.3 MG/DL (ref 0.1–1)
BILIRUB UR QL STRIP: NEGATIVE
BUN SERPL-MCNC: 10 MG/DL (ref 6–20)
CALCIUM SERPL-MCNC: 9.3 MG/DL (ref 8.7–10.5)
CHLORIDE SERPL-SCNC: 104 MMOL/L (ref 95–110)
CLARITY UR: ABNORMAL
CO2 SERPL-SCNC: 19 MMOL/L (ref 23–29)
COLOR UR: YELLOW
CREAT SERPL-MCNC: 0.7 MG/DL (ref 0.5–1.4)
CTP QC/QA: YES
DIFFERENTIAL METHOD BLD: ABNORMAL
EOSINOPHIL # BLD AUTO: 0.1 K/UL (ref 0–0.5)
EOSINOPHIL NFR BLD: 1.3 % (ref 0–8)
ERYTHROCYTE [DISTWIDTH] IN BLOOD BY AUTOMATED COUNT: 13.2 % (ref 11.5–14.5)
EST. GFR  (NO RACE VARIABLE): >60 ML/MIN/1.73 M^2
GLUCOSE SERPL-MCNC: 80 MG/DL (ref 70–110)
GLUCOSE UR QL STRIP: NEGATIVE
HCG INTACT+B SERPL-ACNC: NORMAL MIU/ML
HCT VFR BLD AUTO: 38.2 % (ref 37–48.5)
HGB BLD-MCNC: 12.2 G/DL (ref 12–16)
HGB UR QL STRIP: ABNORMAL
IMM GRANULOCYTES # BLD AUTO: 0.02 K/UL (ref 0–0.04)
IMM GRANULOCYTES NFR BLD AUTO: 0.3 % (ref 0–0.5)
KETONES UR QL STRIP: ABNORMAL
LEUKOCYTE ESTERASE UR QL STRIP: NEGATIVE
LYMPHOCYTES # BLD AUTO: 2.1 K/UL (ref 1–4.8)
LYMPHOCYTES NFR BLD: 33 % (ref 18–48)
MCH RBC QN AUTO: 25.4 PG (ref 27–31)
MCHC RBC AUTO-ENTMCNC: 31.9 G/DL (ref 32–36)
MCV RBC AUTO: 79 FL (ref 82–98)
MICROSCOPIC COMMENT: NORMAL
MONOCYTES # BLD AUTO: 0.5 K/UL (ref 0.3–1)
MONOCYTES NFR BLD: 7.6 % (ref 4–15)
NEUTROPHILS # BLD AUTO: 3.6 K/UL (ref 1.8–7.7)
NEUTROPHILS NFR BLD: 57.6 % (ref 38–73)
NITRITE UR QL STRIP: NEGATIVE
NRBC BLD-RTO: 0 /100 WBC
PH UR STRIP: 6 [PH] (ref 5–8)
PLATELET # BLD AUTO: 257 K/UL (ref 150–450)
PMV BLD AUTO: 9.8 FL (ref 9.2–12.9)
POTASSIUM SERPL-SCNC: 3.6 MMOL/L (ref 3.5–5.1)
PROT SERPL-MCNC: 8.1 G/DL (ref 6–8.4)
PROT UR QL STRIP: ABNORMAL
RBC # BLD AUTO: 4.81 M/UL (ref 4–5.4)
RBC #/AREA URNS HPF: 2 /HPF (ref 0–4)
SODIUM SERPL-SCNC: 134 MMOL/L (ref 136–145)
SP GR UR STRIP: 1.02 (ref 1–1.03)
SQUAMOUS #/AREA URNS HPF: 21 /HPF
URN SPEC COLLECT METH UR: ABNORMAL
UROBILINOGEN UR STRIP-ACNC: NEGATIVE EU/DL
WBC # BLD AUTO: 6.22 K/UL (ref 3.9–12.7)
WBC #/AREA URNS HPF: 4 /HPF (ref 0–5)

## 2024-02-04 PROCEDURE — 99284 EMERGENCY DEPT VISIT MOD MDM: CPT | Mod: 25

## 2024-02-04 PROCEDURE — 81000 URINALYSIS NONAUTO W/SCOPE: CPT

## 2024-02-04 PROCEDURE — 96360 HYDRATION IV INFUSION INIT: CPT

## 2024-02-04 PROCEDURE — 80053 COMPREHEN METABOLIC PANEL: CPT

## 2024-02-04 PROCEDURE — 81025 URINE PREGNANCY TEST: CPT

## 2024-02-04 PROCEDURE — 84702 CHORIONIC GONADOTROPIN TEST: CPT

## 2024-02-04 PROCEDURE — 96361 HYDRATE IV INFUSION ADD-ON: CPT

## 2024-02-04 PROCEDURE — 86901 BLOOD TYPING SEROLOGIC RH(D): CPT

## 2024-02-04 PROCEDURE — 25000003 PHARM REV CODE 250

## 2024-02-04 PROCEDURE — 85025 COMPLETE CBC W/AUTO DIFF WBC: CPT

## 2024-02-04 RX ADMIN — SODIUM CHLORIDE 1000 ML: 9 INJECTION, SOLUTION INTRAVENOUS at 03:02

## 2024-02-04 NOTE — Clinical Note
"Moraima Lemikayla Gao was seen and treated in our emergency department on 2/4/2024.  She may return to work on 02/05/2024.       If you have any questions or concerns, please don't hesitate to call.      Odessa Kelley PA-C"

## 2024-02-04 NOTE — DISCHARGE INSTRUCTIONS
Please return to the Emergency Department for any new or worsening symptoms including: vaginal bleeding, abdominal pain, fever, chest pain, shortness of breath, loss of consciousness, dizziness, weakness, or any other concerns.     Please follow up with your Primary Care Provider within in the week. If you do not have one, you may contact the one listed on your discharge paperwork or you may also call the [facility] Appointment Desk at [phone] to schedule an appointment with one.

## 2024-02-04 NOTE — ED PROVIDER NOTES
"Encounter Date: 2024    SCRIBE #1 NOTE: I, Nella Singletary, am scribing for, and in the presence of,  Odessa Kelley PA-C. I have scribed the following portions of the note - Other sections scribed: HPI, ROS.       History     Chief Complaint   Patient presents with    Abdominal Pain     Pt c/o lower abdominal pain and "pinkish" spotting x today. Pt also reports n/v x 1 week. Pt is currently 7 weeks pregnant. Reports a hx of 1 miscarriage. VSS and NADN.     CC: vaginal bleeding    HPI: This is a 28 y.o.female patient, 7 weeks gravid, A1, and with no pertinent PMHx, presenting to the ED for further evaluation of one episode of vaginal spotting beginning this morning. Patient states she noticed pink blood present on her underwear.  She denies any bleeding at this time.  Patient reports associated symptoms of nausea and vomiting (several episodes). Patient reports LMP: 23. Patient reports history of one miscarriage. Patient reports this is her 3rd pregnancy. Patient reports being seen by OB for this pregnancy at [facility] on 24. Patient reports she is scheduled for her first ultrasound tomorrow. Patient denies any hematemesis. Patient denies any fever, chills, shortness of breath, chest pain, abdominal pain, dysuria, hematemesis, hematuria, or any other associated symptoms. No prior Tx. No alleviating or aggravating factors. No known drug allergies.        The history is provided by the patient. A  was used ([employer] #[id number]).     Review of patient's allergies indicates:  No Known Allergies  History reviewed. No pertinent past medical history.  History reviewed. No pertinent surgical history.  History reviewed. No pertinent family history.  Social History     Tobacco Use    Smoking status: Never    Smokeless tobacco: Never   Substance Use Topics    Alcohol use: Not Currently    Drug use: Never     Review of Systems   Constitutional:  Negative for chills and fever.   HENT:  " Negative for congestion, ear pain, rhinorrhea and sore throat.    Eyes:  Negative for redness.   Respiratory:  Negative for cough and shortness of breath.    Cardiovascular:  Negative for chest pain.   Gastrointestinal:  Positive for nausea and vomiting. Negative for abdominal pain and diarrhea.   Genitourinary:  Positive for vaginal bleeding (spotting). Negative for decreased urine volume, difficulty urinating, dysuria, frequency, hematuria, urgency and vaginal discharge.   Musculoskeletal:  Negative for back pain and neck pain.   Skin:  Negative for rash.   Neurological:  Negative for headaches.   Psychiatric/Behavioral:  Negative for confusion.        Physical Exam     Initial Vitals [02/04/24 1400]   BP Pulse Resp Temp SpO2   131/62 85 16 98.6 °F (37 °C) 100 %      MAP       --         Physical Exam    Nursing note and vitals reviewed.  Constitutional: She appears well-developed and well-nourished.  Non-toxic appearance. She does not appear ill.   HENT:   Head: Normocephalic and atraumatic.   Right Ear: Hearing, tympanic membrane, external ear and ear canal normal. Tympanic membrane is not perforated, not erythematous and not bulging.   Left Ear: Hearing, tympanic membrane, external ear and ear canal normal. Tympanic membrane is not perforated, not erythematous and not bulging.   Nose: Nose normal.   Mouth/Throat: Uvula is midline, oropharynx is clear and moist and mucous membranes are normal.   Eyes: Conjunctivae and EOM are normal.   Neck: Neck supple.   Normal range of motion.   Full passive range of motion without pain.     Cardiovascular:  Normal rate and regular rhythm.           Pulses:       Radial pulses are 2+ on the right side and 2+ on the left side.   Pulmonary/Chest: Effort normal and breath sounds normal. No accessory muscle usage. No respiratory distress. She has no decreased breath sounds.   Abdominal: Abdomen is soft. Bowel sounds are normal. She exhibits no distension. There is no abdominal  tenderness.   No right CVA tenderness.  No left CVA tenderness. There is no rebound and no guarding.   Musculoskeletal:         General: Normal range of motion.      Cervical back: Full passive range of motion without pain, normal range of motion and neck supple. No rigidity.     Neurological: She is alert. No cranial nerve deficit.   Neuro intact.  Strength and sensation intact bilateral upper and lower extremities.   Skin: Skin is warm and dry.   Psychiatric: She has a normal mood and affect.         ED Course   Procedures  Labs Reviewed   CBC W/ AUTO DIFFERENTIAL - Abnormal; Notable for the following components:       Result Value    MCV 79 (*)     MCH 25.4 (*)     MCHC 31.9 (*)     All other components within normal limits    Narrative:     Release to patient->Immediate   COMPREHENSIVE METABOLIC PANEL - Abnormal; Notable for the following components:    Sodium 134 (*)     CO2 19 (*)     All other components within normal limits    Narrative:     Release to patient->Immediate   URINALYSIS, REFLEX TO URINE CULTURE - Abnormal; Notable for the following components:    Appearance, UA Hazy (*)     Protein, UA Trace (*)     Ketones, UA 3+ (*)     Occult Blood UA 3+ (*)     All other components within normal limits    Narrative:     Specimen Source->Urine   POCT URINE PREGNANCY - Abnormal; Notable for the following components:    POC Preg Test, Ur Positive (*)     All other components within normal limits   HCG, QUANTITATIVE    Narrative:     Release to patient->Immediate   URINALYSIS MICROSCOPIC    Narrative:     Specimen Source->Urine   GROUP & RH          Imaging Results              US OB <14 Wks TransAbd & TransVag, Single Gestation (XPD) (Final result)  Result time 02/04/24 16:04:23      Final result by Eduardo Patel MD (02/04/24 16:04:23)                   Impression:      Single living intrauterine gestation, crown-rump length correlates with an estimated gestational age of 6 weeks 6 days, cardiac activity  documented at 132 beats per minute.  There is an adjacent subchorionic hemorrhage, follow-up advised.      Electronically signed by: Eduardo Patel MD  Date:    2024  Time:    16:04               Narrative:    EXAMINATION:  US OB <14 WEEKS, TRANSABDOM & TRANSVAG, SINGLE GESTATION (XPD)    CLINICAL HISTORY:  Vag Bleeding;    TECHNIQUE:  Transabdominal sonography of the pelvis was performed, followed by transvaginal sonography to better evaluate the uterus and ovaries.    COMPARISON:  2023    FINDINGS:  There is a single living intrauterine gestation, crown-rump length correlates with an estimated gestational age of 6 weeks 6 days, cardiac activity documented at 132 beats per minute.  There is trace fluid in the cervical canal.  There is an adjacent subchorionic hemorrhage measuring 1.2 x 1.9 x 0.3 cm.    The right ovary measures approximately 2.6 x 1.8 x 2.2 cm.  The left ovary measures approximately 3.0 x 3.8 x 1.6 cm.  Arterial and venous flow is documented to the ovaries bilaterally.  No significant free fluid in the pelvis.                                       Medications   sodium chloride 0.9% bolus 1,000 mL 1,000 mL (0 mLs Intravenous Stopped 24)     Medical Decision Making  This is a  28 y.o.female patient, 7 weeks gravid, A1, and with no pertinent PMHx, presenting to the ED for further evaluation of one episode of vaginal spotting beginning this morning.   Patient states she noticed pink blood present on her underwear.She denies any bleeding at this time.  Patient reports associated symptoms of nausea and vomiting (several episodes).  On physical exam, patient is well-appearing and in no acute distress.  Nontoxic appearing.  Lungs are clear to auscultation bilaterally.  Abdomen is soft and nontender.  No guarding, rigidity, rebound.  2+ radial pulses bilaterally.  Posterior oropharynx is not erythematous.  No edema or exudate.  Uvula midline.  Bilateral tympanic membrane is normal.   No erythema, bulging, or perforations.  Neuro intact.  Strength and sensation intact bilateral upper and lower extremities.  No CVA tenderness bilaterally.  Fluids ordered.  Offered patient's Zofran; however, she would like to decline at this time.  CBC without evidence of any leukocytosis or anemia.  UA unremarkable.  Doubt cystitis.  UPT positive.  Ultrasound revealed   Single living intrauterine gestation, crown-rump length correlates with an estimated gestational age of 6 weeks 6 days, cardiac activity documented at 132 beats per minute.  There is an adjacent subchorionic hemorrhage, follow-up advised.  CMP revealed sodium 134, CO2 19.  Otherwise no other electrolyte abnormality.  [place] [address].  Patient is B-positive.  She does not need RhoGAM at this time.  Patient has an appointment with her OBGYN tomorrow.  Urged prompt follow-up with OBGYN and PCP for further evaluation.    Strict return precautions given. I discussed with the patient/family the diagnosis, treatment plan, indications for return to the emergency department, and for expected follow-up. The patient/family verbalized an understanding. The patient/family is asked if there are any questions or concerns. We discuss the case, until all issues are addressed to the patient/family's satisfaction. Patient/family understands and is agreeable to the plan. Patient is stable and ready for discharge.          Amount and/or Complexity of Data Reviewed  Labs: ordered.  Radiology: ordered.            Scribe Attestation:   Scribe #1: I performed the above scribed service and the documentation accurately describes the services I performed. I attest to the accuracy of the note.                               Clinical Impression:  Final diagnoses:  [O20.9] Vaginal bleeding affecting early pregnancy (Primary)          ED Disposition Condition    Discharge Stable          ED Prescriptions    None       Follow-up Information       Follow up With  Specialties Details Why Contact Info    [facility] -  Schedule an appointment as soon as possible for a visit in 2 days for further evaluation [address] [place] [address]  [phone]      [facility] Emergency Medicine In 2 days If symptoms worsen [address]  [facility] [place] [id number] [phone]          I, Odessa Kelley, personally performed the services described in this documentation. All medical record entries made by the scribe were at my direction and in my presence. I have reviewed the chart and agree that the record reflects my personal performance and is accurate and complete.       Odessa Kelley PA-C  02/04/24 8907

## 2024-02-04 NOTE — ED TRIAGE NOTES
7 weeks pregnant with lower abd pain and pink spotting started today.nausea and vomiting x 1 week.

## 2024-02-05 ENCOUNTER — PROCEDURE VISIT (OUTPATIENT)
Dept: MATERNAL FETAL MEDICINE | Facility: CLINIC | Age: 29
End: 2024-02-05
Payer: MEDICAID

## 2024-02-05 DIAGNOSIS — Z34.81 PRENATAL CARE, SUBSEQUENT PREGNANCY IN FIRST TRIMESTER: ICD-10-CM

## 2024-02-05 PROCEDURE — 76801 OB US < 14 WKS SINGLE FETUS: CPT | Mod: PBBFAC | Performed by: OBSTETRICS & GYNECOLOGY

## 2024-02-12 ENCOUNTER — HOSPITAL ENCOUNTER (EMERGENCY)
Facility: HOSPITAL | Age: 29
Discharge: HOME | End: 2024-02-13
Attending: STUDENT IN AN ORGANIZED HEALTH CARE EDUCATION/TRAINING PROGRAM
Payer: MEDICAID

## 2024-02-12 DIAGNOSIS — Z3A.01 LESS THAN 8 WEEKS GESTATION OF PREGNANCY: ICD-10-CM

## 2024-02-12 DIAGNOSIS — R11.2 NAUSEA AND VOMITING, UNSPECIFIED VOMITING TYPE: Primary | ICD-10-CM

## 2024-02-12 PROCEDURE — 81000 URINALYSIS NONAUTO W/SCOPE: CPT | Performed by: STUDENT IN AN ORGANIZED HEALTH CARE EDUCATION/TRAINING PROGRAM

## 2024-02-12 PROCEDURE — 99284 EMERGENCY DEPT VISIT MOD MDM: CPT

## 2024-02-12 RX ORDER — ONDANSETRON HYDROCHLORIDE 2 MG/ML
4 INJECTION, SOLUTION INTRAVENOUS
Status: COMPLETED | OUTPATIENT
Start: 2024-02-13 | End: 2024-02-13

## 2024-02-13 VITALS
HEART RATE: 83 BPM | OXYGEN SATURATION: 98 % | RESPIRATION RATE: 16 BRPM | DIASTOLIC BLOOD PRESSURE: 60 MMHG | WEIGHT: 250 LBS | TEMPERATURE: 99 F | BODY MASS INDEX: 41.6 KG/M2 | SYSTOLIC BLOOD PRESSURE: 105 MMHG

## 2024-02-13 LAB
ALBUMIN SERPL BCP-MCNC: 4 G/DL (ref 3.5–5.2)
ALP SERPL-CCNC: 59 U/L (ref 55–135)
ALT SERPL W/O P-5'-P-CCNC: 10 U/L (ref 10–44)
ANION GAP SERPL CALC-SCNC: 10 MMOL/L (ref 8–16)
AST SERPL-CCNC: 16 U/L (ref 10–40)
BACTERIA #/AREA URNS HPF: NORMAL /HPF
BASOPHILS # BLD AUTO: 0.02 K/UL (ref 0–0.2)
BASOPHILS NFR BLD: 0.3 % (ref 0–1.9)
BILIRUB SERPL-MCNC: 0.4 MG/DL (ref 0.1–1)
BILIRUB UR QL STRIP: NEGATIVE
BUN SERPL-MCNC: 6 MG/DL (ref 6–20)
CALCIUM SERPL-MCNC: 9.4 MG/DL (ref 8.7–10.5)
CHLORIDE SERPL-SCNC: 103 MMOL/L (ref 95–110)
CLARITY UR: ABNORMAL
CO2 SERPL-SCNC: 21 MMOL/L (ref 23–29)
COLOR UR: YELLOW
CREAT SERPL-MCNC: 0.7 MG/DL (ref 0.5–1.4)
DIFFERENTIAL METHOD BLD: ABNORMAL
EOSINOPHIL # BLD AUTO: 0 K/UL (ref 0–0.5)
EOSINOPHIL NFR BLD: 0.6 % (ref 0–8)
ERYTHROCYTE [DISTWIDTH] IN BLOOD BY AUTOMATED COUNT: 13.2 % (ref 11.5–14.5)
EST. GFR  (NO RACE VARIABLE): >60 ML/MIN/1.73 M^2
GLUCOSE SERPL-MCNC: 85 MG/DL (ref 70–110)
GLUCOSE UR QL STRIP: NEGATIVE
HCG INTACT+B SERPL-ACNC: NORMAL MIU/ML
HCT VFR BLD AUTO: 39.2 % (ref 37–48.5)
HGB BLD-MCNC: 12.9 G/DL (ref 12–16)
HGB UR QL STRIP: NEGATIVE
HYALINE CASTS #/AREA URNS LPF: 0 /LPF
IMM GRANULOCYTES # BLD AUTO: 0.01 K/UL (ref 0–0.04)
IMM GRANULOCYTES NFR BLD AUTO: 0.1 % (ref 0–0.5)
KETONES UR QL STRIP: ABNORMAL
LEUKOCYTE ESTERASE UR QL STRIP: NEGATIVE
LYMPHOCYTES # BLD AUTO: 2.2 K/UL (ref 1–4.8)
LYMPHOCYTES NFR BLD: 31.7 % (ref 18–48)
MAGNESIUM SERPL-MCNC: 2 MG/DL (ref 1.6–2.6)
MCH RBC QN AUTO: 25.9 PG (ref 27–31)
MCHC RBC AUTO-ENTMCNC: 32.9 G/DL (ref 32–36)
MCV RBC AUTO: 79 FL (ref 82–98)
MICROSCOPIC COMMENT: NORMAL
MONOCYTES # BLD AUTO: 0.7 K/UL (ref 0.3–1)
MONOCYTES NFR BLD: 9.7 % (ref 4–15)
NEUTROPHILS # BLD AUTO: 4.1 K/UL (ref 1.8–7.7)
NEUTROPHILS NFR BLD: 57.6 % (ref 38–73)
NITRITE UR QL STRIP: NEGATIVE
NRBC BLD-RTO: 0 /100 WBC
PH UR STRIP: 6 [PH] (ref 5–8)
PLATELET # BLD AUTO: 270 K/UL (ref 150–450)
PMV BLD AUTO: 9.3 FL (ref 9.2–12.9)
POTASSIUM SERPL-SCNC: 3.4 MMOL/L (ref 3.5–5.1)
PROT SERPL-MCNC: 8.1 G/DL (ref 6–8.4)
PROT UR QL STRIP: ABNORMAL
RBC # BLD AUTO: 4.98 M/UL (ref 4–5.4)
RBC #/AREA URNS HPF: 0 /HPF (ref 0–4)
SODIUM SERPL-SCNC: 134 MMOL/L (ref 136–145)
SP GR UR STRIP: >1.03 (ref 1–1.03)
URN SPEC COLLECT METH UR: ABNORMAL
UROBILINOGEN UR STRIP-ACNC: NEGATIVE EU/DL
WBC # BLD AUTO: 7.04 K/UL (ref 3.9–12.7)
WBC #/AREA URNS HPF: 0 /HPF (ref 0–5)

## 2024-02-13 PROCEDURE — 83735 ASSAY OF MAGNESIUM: CPT | Performed by: STUDENT IN AN ORGANIZED HEALTH CARE EDUCATION/TRAINING PROGRAM

## 2024-02-13 PROCEDURE — 96375 TX/PRO/DX INJ NEW DRUG ADDON: CPT

## 2024-02-13 PROCEDURE — 80053 COMPREHEN METABOLIC PANEL: CPT | Performed by: STUDENT IN AN ORGANIZED HEALTH CARE EDUCATION/TRAINING PROGRAM

## 2024-02-13 PROCEDURE — 96365 THER/PROPH/DIAG IV INF INIT: CPT

## 2024-02-13 PROCEDURE — 84702 CHORIONIC GONADOTROPIN TEST: CPT | Performed by: STUDENT IN AN ORGANIZED HEALTH CARE EDUCATION/TRAINING PROGRAM

## 2024-02-13 PROCEDURE — 25000003 PHARM REV CODE 250: Performed by: STUDENT IN AN ORGANIZED HEALTH CARE EDUCATION/TRAINING PROGRAM

## 2024-02-13 PROCEDURE — 96366 THER/PROPH/DIAG IV INF ADDON: CPT

## 2024-02-13 PROCEDURE — 85025 COMPLETE CBC W/AUTO DIFF WBC: CPT | Performed by: STUDENT IN AN ORGANIZED HEALTH CARE EDUCATION/TRAINING PROGRAM

## 2024-02-13 PROCEDURE — 63600175 PHARM REV CODE 636 W HCPCS: Performed by: STUDENT IN AN ORGANIZED HEALTH CARE EDUCATION/TRAINING PROGRAM

## 2024-02-13 RX ORDER — ALUMINUM HYDROXIDE, MAGNESIUM HYDROXIDE, AND SIMETHICONE 1200; 120; 1200 MG/30ML; MG/30ML; MG/30ML
30 SUSPENSION ORAL
Status: COMPLETED | OUTPATIENT
Start: 2024-02-13 | End: 2024-02-13

## 2024-02-13 RX ORDER — DOXYLAMINE SUCCINATE AND PYRIDOXINE HYDROCHLORIDE, DELAYED RELEASE TABLETS 10 MG/10 MG 10; 10 MG/1; MG/1
2 TABLET, DELAYED RELEASE ORAL NIGHTLY PRN
Qty: 30 TABLET | Refills: 0 | Status: SHIPPED | OUTPATIENT
Start: 2024-02-13

## 2024-02-13 RX ORDER — FAMOTIDINE 20 MG
1 TABLET ORAL DAILY
Qty: 30 TABLET | Refills: 0 | Status: SHIPPED | OUTPATIENT
Start: 2024-02-13 | End: 2024-03-14

## 2024-02-13 RX ADMIN — ALUMINUM HYDROXIDE, MAGNESIUM HYDROXIDE, AND DIMETHICONE 30 ML: 200; 20; 200 SUSPENSION ORAL at 01:02

## 2024-02-13 RX ADMIN — DEXTROSE AND SODIUM CHLORIDE 1000 ML: 5; 900 INJECTION, SOLUTION INTRAVENOUS at 12:02

## 2024-02-13 RX ADMIN — ONDANSETRON 4 MG: 2 INJECTION INTRAMUSCULAR; INTRAVENOUS at 12:02

## 2024-02-13 NOTE — ED PROVIDER NOTES
Encounter Date: 2/12/2024       History     Chief Complaint   Patient presents with    Vomiting     Vomiting x 15 days, 8 weeks pregnant. Was seen by OB, third pregnancy        28-year-old female approximately 7 weeks' gestation presents to emergency department with nausea vomiting that has been ongoing for the past 2 weeks.  Patient reports having multiple episodes of emesis per day and today was not able to keep anything down so came to the emergency department for evaluation.  She reports at times having epigastric pain.  Denies any other abdominal pain specifically denies suprapubic pain.  Denies any vaginal bleeding or discharge.  Denies any dysuria or hematuria.  Denies any fever or chills.  Denies any diarrhea or constipation.    The history is provided by the patient. The history is limited by a language barrier. A  was used ([id number]).     Review of patient's allergies indicates:  No Known Allergies  No past medical history on file.  No past surgical history on file.  No family history on file.  Social History     Tobacco Use    Smoking status: Never    Smokeless tobacco: Never   Substance Use Topics    Alcohol use: Not Currently    Drug use: Never     Review of Systems   Constitutional:  Negative for fever.   HENT:  Negative for sore throat.    Respiratory:  Negative for shortness of breath.    Cardiovascular:  Negative for chest pain.   Gastrointestinal:  Positive for nausea and vomiting. Negative for abdominal pain.   Genitourinary:  Negative for dysuria, hematuria, vaginal bleeding and vaginal discharge.   Musculoskeletal:  Negative for back pain.   Skin:  Negative for rash.   Neurological:  Negative for weakness.   Hematological:  Does not bruise/bleed easily.       Physical Exam     Initial Vitals [02/12/24 2223]   BP Pulse Resp Temp SpO2   132/77 98 18 99.6 °F (37.6 °C) 99 %      MAP       --         Physical Exam    Nursing note and vitals reviewed.  Constitutional: She is not  diaphoretic. No distress.   HENT:   Head: Normocephalic and atraumatic.   Eyes: Conjunctivae and EOM are normal. Pupils are equal, round, and reactive to light.   Neck:   Normal range of motion.  Pulmonary/Chest: Breath sounds normal. No respiratory distress.   Abdominal: Abdomen is soft. Bowel sounds are normal. She exhibits no distension. There is abdominal tenderness in the epigastric area.   Musculoskeletal:         General: No tenderness. Normal range of motion.      Cervical back: Normal range of motion.     Neurological: She is alert.   Skin: Skin is warm. Capillary refill takes less than 2 seconds.   Psychiatric: Her behavior is normal.         ED Course   Procedures  Labs Reviewed   CBC W/ AUTO DIFFERENTIAL - Abnormal; Notable for the following components:       Result Value    MCV 79 (*)     MCH 25.9 (*)     All other components within normal limits    Narrative:     Release to patient->Immediate   COMPREHENSIVE METABOLIC PANEL - Abnormal; Notable for the following components:    Sodium 134 (*)     Potassium 3.4 (*)     CO2 21 (*)     All other components within normal limits    Narrative:     Release to patient->Immediate   URINALYSIS, REFLEX TO URINE CULTURE - Abnormal; Notable for the following components:    Appearance, UA Hazy (*)     Specific Gravity, UA >1.030 (*)     Protein, UA 2+ (*)     Ketones, UA 3+ (*)     All other components within normal limits    Narrative:     Specimen Source->Urine   MAGNESIUM    Narrative:     Release to patient->Immediate   HCG, QUANTITATIVE    Narrative:     Release to patient->Immediate   URINALYSIS MICROSCOPIC    Narrative:     Specimen Source->Urine          Imaging Results    None          Medications   dextrose 5 % and 0.9% NaCl 5-0.9 % bolus 1,000 mL (1,000 mLs Intravenous New Bag 2/13/24 0022)   ondansetron injection 4 mg (4 mg Intravenous Given 2/13/24 0021)   aluminum-magnesium hydroxide-simethicone 200-200-20 mg/5 mL suspension 30 mL (30 mLs Oral Given  24 0148)     Medical Decision Making:   History:   Old Medical Records: I decided to obtain old medical records.  Initial Assessment:   28-year-old female approximately 7 weeks' gestation presents to emergency department with nausea vomiting that has been ongoing for the past 2 weeks.  Patient in no acute distress, vitals within normal limits, abdominal exam benign.  Suspect patient's symptoms are secondary to current pregnancy.  Will obtain lab work to assess for possible pancreatitis.  Will treat with IV fluids, Zofran and reassess.  Patient without any vaginal bleeding or lower abdominal pain so low suspicion for any acute pregnancy related complications such as threatened  or miscarriage.  Will give a GI cocktail and reassess.  Differential Diagnosis:   Differential Diagnosis includes, but is not limited to:  Bowel obstruction, incarcerated/strangulated hernia, ileus, appendicitis, cholecystitis, aspirated foreign body, esophageal food impaction, biliary colic, colitis/diverticulitis, gastroenteritis, esophagitis, hepatitis, pancreatitis, GERD, PUD, constipation, nephrolithiasis, UTI/pyelonephritis.     Clinical Tests:   Lab Tests: Ordered and Reviewed             ED Course as of 247      0031 Ketones, UA(!): 3+  UA without signs of infection.  3+ ketones likely due to hyperemesis [AS]   0145 CBC without significant leukocytosis, anemia (at baseline), or platelet abnormalities.  Chem 14 negative for hypo-or hyper natremia, kalemia , chloridemia, or other electrolyte abnormalities; BUN and creatinine (at baseline), ALT and AST were within normal limits indicating normal liver function.   [AS]   0211 Patient reports significant improvement of her symptoms. Pt is currently stable for discharge. I see no indication of an emergent process beyond that addressed during our encounter but have duly counseled the patient/family regarding the need for prompt follow-up as well as the  indications that should prompt immediate return to the emergency room should new or worrisome developments occur. I discussed the ED work up and diagnostic findings with the patient/family. The patient/family has been provided with verbal and printed direction regarding our final diagnosis(es) as well as instructions regarding use of OTC and/or Rx medications intended to manage the patient's aforementioned conditions. The patient/family verbalized an understanding. The patient/family is asked if there are any questions or concerns. We discuss the case, until all issues are addressed to the patient/family's satisfaction. Patient/family understands and is agreeable to the plan.  [AS]      ED Course User Index  [AS] Mike Workman MD          Medical Decision Making  Amount and/or Complexity of Data Reviewed  Labs: ordered. Decision-making details documented in ED Course.    Risk  OTC drugs.  Prescription drug management.           Clinical Impression:   Final diagnoses:  [R11.2] Nausea and vomiting, unspecified vomiting type (Primary)  [Z3A.01] Less than 8 weeks gestation of pregnancy          ED Disposition Condition    Discharge Stable          ED Prescriptions       Medication Sig Dispense Start Date End Date Auth. Provider    doxylamine-pyridoxine, vit B6, (DICLEGIS) 10-10 mg TbEC Take 2 tablets by mouth nightly as needed. 30 tablet 2/13/2024 -- Mike Workman MD    prenatal 21-iron fu-folic acid (PRENATAL COMPLETE) 14 mg iron- 400 mcg Tab Take 1 tablet by mouth once daily. 30 tablet 2/13/2024 3/14/2024 Mike Workman MD          Follow-up Information       Follow up With Specialties Details Why Contact [facility] - Emergency Dept Emergency Medicine  If symptoms worsen [address] [facility] [place] [address] [phone]    Primary care doctor  Schedule an appointment as soon as possible for a visit  for reassesment     OB Gyne  Schedule an  appointment as soon as possible for a visit  for reassesment             DISCLAIMER: This note was prepared with [employer] voice recognition transcription software. Garbled syntax, mangled pronouns, and other bizarre constructions may be attributed to that software system.     Mike Workman MD  02/13/24 0217

## 2024-02-13 NOTE — ED TRIAGE NOTES
Patient presents to the ED c/o nausea and vomiting. Pt reports she is 8 weeks pregnant and has been vomiting nonstop over the last 15 days. Denies diarrhea. Pt actively dry-heaving.

## 2024-02-13 NOTE — DISCHARGE INSTRUCTIONS
Thank you for coming to our Emergency Department today. It is important to remember that some problems are difficult to diagnose and may not be found during your first visit. Be sure to follow up with your primary care doctor and review any labs/imaging that was performed with them. If you do not have a primary care doctor, you may contact the one listed on your discharge paperwork or you may also call the [facility] Appointment Desk at [phone] to schedule an appointment with one.     All medications may potentially have side effects and it is impossible to predict which medications may give you side effects. If you feel that you are having a negative effect of any medication you should immediately stop taking them and seek medical attention.    Return to the ER with any questions/concerns, new/concerning symptoms, worsening or failure to improve. Do not drive or make any important decisions for 24 hours if you have received any pain medications, sedatives or mood altering drugs during your ER visit.

## 2024-02-21 ENCOUNTER — HOSPITAL ENCOUNTER (EMERGENCY)
Facility: HOSPITAL | Age: 29
Discharge: HOME | End: 2024-02-21
Attending: EMERGENCY MEDICINE
Payer: MEDICAID

## 2024-02-21 VITALS
RESPIRATION RATE: 18 BRPM | HEIGHT: 63 IN | SYSTOLIC BLOOD PRESSURE: 129 MMHG | HEART RATE: 79 BPM | OXYGEN SATURATION: 100 % | DIASTOLIC BLOOD PRESSURE: 61 MMHG | TEMPERATURE: 98 F | WEIGHT: 250 LBS | BODY MASS INDEX: 44.3 KG/M2

## 2024-02-21 DIAGNOSIS — O20.9 VAGINAL BLEEDING IN PREGNANCY, FIRST TRIMESTER: Primary | ICD-10-CM

## 2024-02-21 DIAGNOSIS — E87.6 HYPOKALEMIA: ICD-10-CM

## 2024-02-21 LAB
ABO + RH BLD: NORMAL
ALBUMIN SERPL BCP-MCNC: 4.1 G/DL (ref 3.5–5.2)
ALP SERPL-CCNC: 76 U/L (ref 55–135)
ALT SERPL W/O P-5'-P-CCNC: 10 U/L (ref 10–44)
ANION GAP SERPL CALC-SCNC: 9 MMOL/L (ref 8–16)
AST SERPL-CCNC: 18 U/L (ref 10–40)
B-HCG UR QL: POSITIVE
BASOPHILS # BLD AUTO: 0.03 K/UL (ref 0–0.2)
BASOPHILS NFR BLD: 0.6 % (ref 0–1.9)
BILIRUB SERPL-MCNC: 0.3 MG/DL (ref 0.1–1)
BUN SERPL-MCNC: 5 MG/DL (ref 6–20)
CALCIUM SERPL-MCNC: 10.1 MG/DL (ref 8.7–10.5)
CHLORIDE SERPL-SCNC: 104 MMOL/L (ref 95–110)
CO2 SERPL-SCNC: 22 MMOL/L (ref 23–29)
CREAT SERPL-MCNC: 0.7 MG/DL (ref 0.5–1.4)
CTP QC/QA: YES
DIFFERENTIAL METHOD BLD: ABNORMAL
EOSINOPHIL # BLD AUTO: 0.1 K/UL (ref 0–0.5)
EOSINOPHIL NFR BLD: 1 % (ref 0–8)
ERYTHROCYTE [DISTWIDTH] IN BLOOD BY AUTOMATED COUNT: 13.1 % (ref 11.5–14.5)
EST. GFR  (NO RACE VARIABLE): >60 ML/MIN/1.73 M^2
GLUCOSE SERPL-MCNC: 76 MG/DL (ref 70–110)
HCG INTACT+B SERPL-ACNC: NORMAL MIU/ML
HCT VFR BLD AUTO: 38.9 % (ref 37–48.5)
HGB BLD-MCNC: 13 G/DL (ref 12–16)
IMM GRANULOCYTES # BLD AUTO: 0.01 K/UL (ref 0–0.04)
IMM GRANULOCYTES NFR BLD AUTO: 0.2 % (ref 0–0.5)
LYMPHOCYTES # BLD AUTO: 2.1 K/UL (ref 1–4.8)
LYMPHOCYTES NFR BLD: 41.2 % (ref 18–48)
MCH RBC QN AUTO: 25.9 PG (ref 27–31)
MCHC RBC AUTO-ENTMCNC: 33.4 G/DL (ref 32–36)
MCV RBC AUTO: 78 FL (ref 82–98)
MONOCYTES # BLD AUTO: 0.5 K/UL (ref 0.3–1)
MONOCYTES NFR BLD: 9.1 % (ref 4–15)
NEUTROPHILS # BLD AUTO: 2.5 K/UL (ref 1.8–7.7)
NEUTROPHILS NFR BLD: 47.9 % (ref 38–73)
NRBC BLD-RTO: 0 /100 WBC
PLATELET # BLD AUTO: 284 K/UL (ref 150–450)
PMV BLD AUTO: 9.5 FL (ref 9.2–12.9)
POTASSIUM SERPL-SCNC: 3.4 MMOL/L (ref 3.5–5.1)
PROT SERPL-MCNC: 8 G/DL (ref 6–8.4)
RBC # BLD AUTO: 5.01 M/UL (ref 4–5.4)
SODIUM SERPL-SCNC: 135 MMOL/L (ref 136–145)
WBC # BLD AUTO: 5.15 K/UL (ref 3.9–12.7)

## 2024-02-21 PROCEDURE — 25000003 PHARM REV CODE 250: Performed by: EMERGENCY MEDICINE

## 2024-02-21 PROCEDURE — 81025 URINE PREGNANCY TEST: CPT

## 2024-02-21 PROCEDURE — 86901 BLOOD TYPING SEROLOGIC RH(D): CPT

## 2024-02-21 PROCEDURE — 80053 COMPREHEN METABOLIC PANEL: CPT

## 2024-02-21 PROCEDURE — 25000003 PHARM REV CODE 250

## 2024-02-21 PROCEDURE — 84702 CHORIONIC GONADOTROPIN TEST: CPT

## 2024-02-21 PROCEDURE — 85025 COMPLETE CBC W/AUTO DIFF WBC: CPT

## 2024-02-21 PROCEDURE — 96360 HYDRATION IV INFUSION INIT: CPT

## 2024-02-21 PROCEDURE — 99285 EMERGENCY DEPT VISIT HI MDM: CPT | Mod: 25

## 2024-02-21 RX ORDER — POTASSIUM CHLORIDE 20 MEQ/1
40 TABLET, EXTENDED RELEASE ORAL
Status: COMPLETED | OUTPATIENT
Start: 2024-02-21 | End: 2024-02-21

## 2024-02-21 RX ADMIN — POTASSIUM CHLORIDE 40 MEQ: 1500 TABLET, EXTENDED RELEASE ORAL at 10:02

## 2024-02-21 RX ADMIN — SODIUM CHLORIDE 1000 ML: 9 INJECTION, SOLUTION INTRAVENOUS at 10:02

## 2024-02-22 NOTE — DISCHARGE INSTRUCTIONS
Drink plenty of electrolyte-rich fluids (at least one gallon or more daily).  Avoid caffeine (such as coffee, tea, Coke, Coke Zero, Pepsi, Root Beer, Dr .Pepper, Mountain Dew, energy drinks, pre-workout supplements, and many others.) and alcohol intake during pregnancy.

## 2024-02-22 NOTE — ED PROVIDER NOTES
Encounter Date: 2024       History     Chief Complaint   Patient presents with    Vaginal Bleeding     Patient presents to the ED with reports of having vaginal bleeding that started today. States she is 9 weeks pregnant. Symptoms include back pain.      28 y.o. female  @ 9 2 WGA by ROSEMARIE 24 presents emergency department complaining of acute vaginal bleeding that began today.  She reports using four pads today.  She denies abdominal pain, dysuria, frequency, hematuria, oliguria, diarrhea, constipation, vaginal discharge or appetite change.  She endorses intermittent nausea and vomiting throughout pregnancy with the most recent episode of emesis being earlier today (nonbloody, nonbilious, non coffee-ground-appearing emesis).  She reports vaginal bleeding at six weeks' gestation with reported normal ultrasound at that time.    The history is provided by the patient. The history is limited by a language barrier. A  was used.     Review of patient's allergies indicates:  No Known Allergies  No past medical history on file.  No past surgical history on file.  No family history on file.  Social History     Tobacco Use    Smoking status: Never    Smokeless tobacco: Never   Substance Use Topics    Alcohol use: Not Currently    Drug use: Never     Review of Systems   Constitutional:  Negative for appetite change and fever.   Respiratory:  Negative for shortness of breath.    Cardiovascular:  Negative for chest pain and leg swelling.   Gastrointestinal:  Positive for nausea and vomiting. Negative for abdominal pain, blood in stool, constipation and diarrhea.   Genitourinary:  Positive for vaginal bleeding. Negative for decreased urine volume, dysuria, frequency and hematuria.   Musculoskeletal:  Negative for back pain and gait problem.   Neurological:  Negative for dizziness and syncope.       Physical Exam     Initial Vitals [24]   BP Pulse Resp Temp SpO2   131/60 98 16 99.1 °F  (37.3 °C) 99 %      MAP       --         Physical Exam    Nursing note and vitals reviewed.  Constitutional: She appears well-developed and well-nourished. She is not diaphoretic. No distress.   HENT:   Head: Normocephalic and atraumatic.   Mouth/Throat: Oropharynx is clear and moist.   Eyes: Conjunctivae are normal.   Neck: Neck supple.   Normal range of motion.  Cardiovascular:  Normal rate and intact distal pulses.           Pulmonary/Chest: No accessory muscle usage or stridor. No tachypnea. No respiratory distress. She has no wheezes. She has no rhonchi. She has no rales.   Abdominal: Abdomen is soft. Bowel sounds are normal. She exhibits no distension. There is no abdominal tenderness. There is no rebound and no guarding.   Musculoskeletal:         General: No tenderness or edema. Normal range of motion.      Cervical back: Normal range of motion and neck supple.     Neurological: She is alert and oriented to person, place, and time. She has normal strength. Gait normal. GCS eye subscore is 4. GCS verbal subscore is 5. GCS motor subscore is 6.   Skin: Skin is warm. Capillary refill takes less than 2 seconds.   Psychiatric: She has a normal mood and affect.         ED Course   Procedures  Labs Reviewed   CBC W/ AUTO DIFFERENTIAL - Abnormal; Notable for the following components:       Result Value    MCV 78 (*)     MCH 25.9 (*)     All other components within normal limits    Narrative:     Release to patient->Immediate   COMPREHENSIVE METABOLIC PANEL - Abnormal; Notable for the following components:    Sodium 135 (*)     Potassium 3.4 (*)     CO2 22 (*)     BUN 5 (*)     All other components within normal limits    Narrative:     Release to patient->Immediate   POCT URINE PREGNANCY - Abnormal; Notable for the following components:    POC Preg Test, Ur Positive (*)     All other components within normal limits   HCG, QUANTITATIVE    Narrative:     Release to patient->Immediate   GROUP & RH          Imaging  Results              US OB <14 Wks TransAbd & TransVag, Single Gestation (XPD) (Final result)  Result time 02/21/24 21:46:31      Final result by Bandar Diaz MD (02/21/24 21:46:31)                   Impression:      Single intrauterine pregnancy which corresponds to a 9 weeks 3 days gestation by crown rump length. Fetal heart rate is 170 BPM.      Electronically signed by: Bandar Diaz MD  Date:    02/21/2024  Time:    21:46               Narrative:    EXAMINATION:  US OB <14 WEEKS, TRANSABDOM & TRANSVAG, SINGLE GESTATION (XPD)    CLINICAL HISTORY:  Vag Bleeding;    TECHNIQUE:  Transabdominal sonography of the pelvis was performed, followed by transvaginal sonography to better evaluate the uterus and ovaries.    COMPARISON:  02/04/2024.    FINDINGS:  The uterus measures 12 x 8 x 8 cm. Uterine parenchyma is heterogenous in echotexture. In the uterine cavity there is a gestational sac with a mean diameter of 3.9 cm. The fetus measures 2.6 cm by crown rump length and corresponds to a 9 weeks 3 days gestation. Fetal heart rate is 170 BPM. The yolk sac measures 0.3 cm.  Small amount of adjacent subchorionic hemorrhage is seen.    The right ovary measures 5 x 3 x 2 cm. The left ovary measures 5 x 4 x 2 cm. Arterial and venous flow are preserved bilaterally. A suspected corpus luteum cyst measuring 2 cm is seen in the left ovary. No significant free fluid is seen.                                       Medications   sodium chloride 0.9% bolus 1,000 mL 1,000 mL (0 mLs Intravenous Stopped 2/21/24 9087)   potassium chloride SA CR tablet 40 mEq (40 mEq Oral Given 2/21/24 2249)     Medical Decision Making  Risk  Prescription drug management.               ED Course as of 02/28/24 1611   Wed Feb 21, 2024   2156 Blood type B positive [DL]      ED Course User Index  [DL] Lindsay Rojo MD        Labs Reviewed  Pertinent lab and imaging findings include:  There is no leukocytosis, H&H within normal limits, platelets within  normal limits, CMP with mild hypokalemia (potassium 3.4), mild hyponatremia (sodium 135), beta-hCG appropriately elevated, OB ultrasound with single live IUP with fetal heart rate 170 with measurements corresponding to nine weeks three days' gestation.   Admission on 02/21/2024, Discharged on 02/21/2024   Component Date Value Ref Range Status    WBC 02/21/2024 5.15  3.90 - 12.70 K/uL Final    RBC 02/21/2024 5.01  4.00 - 5.40 M/uL Final    Hemoglobin 02/21/2024 13.0  12.0 - 16.0 g/dL Final    Hematocrit 02/21/2024 38.9  37.0 - 48.5 % Final    MCV 02/21/2024 78 (L)  82 - 98 fL Final    MCH 02/21/2024 25.9 (L)  27.0 - 31.0 pg Final    MCHC 02/21/2024 33.4  32.0 - 36.0 g/dL Final    RDW 02/21/2024 13.1  11.5 - 14.5 % Final    Platelets 02/21/2024 284  150 - 450 K/uL Final    MPV 02/21/2024 9.5  9.2 - 12.9 fL Final    Immature Granulocytes 02/21/2024 0.2  0.0 - 0.5 % Final    Gran # (ANC) 02/21/2024 2.5  1.8 - 7.7 K/uL Final    Immature Grans (Abs) 02/21/2024 0.01  0.00 - 0.04 K/uL Final    Comment: Mild elevation in immature granulocytes is non specific and   can be seen in a variety of conditions including stress response,   acute inflammation, trauma and pregnancy. Correlation with other   laboratory and clinical findings is essential.      Lymph # 02/21/2024 2.1  1.0 - 4.8 K/uL Final    Mono # 02/21/2024 0.5  0.3 - 1.0 K/uL Final    Eos # 02/21/2024 0.1  0.0 - 0.5 K/uL Final    Baso # 02/21/2024 0.03  0.00 - 0.20 K/uL Final    nRBC 02/21/2024 0  0 /100 WBC Final    Gran % 02/21/2024 47.9  38.0 - 73.0 % Final    Lymph % 02/21/2024 41.2  18.0 - 48.0 % Final    Mono % 02/21/2024 9.1  4.0 - 15.0 % Final    Eosinophil % 02/21/2024 1.0  0.0 - 8.0 % Final    Basophil % 02/21/2024 0.6  0.0 - 1.9 % Final    Differential Method 02/21/2024 Automated   Final    Sodium 02/21/2024 135 (L)  136 - 145 mmol/L Final    Potassium 02/21/2024 3.4 (L)  3.5 - 5.1 mmol/L Final    Chloride 02/21/2024 104  95 - 110 mmol/L Final    CO2  02/21/2024 22 (L)  23 - 29 mmol/L Final    Glucose 02/21/2024 76  70 - 110 mg/dL Final    BUN 02/21/2024 5 (L)  6 - 20 mg/dL Final    Creatinine 02/21/2024 0.7  0.5 - 1.4 mg/dL Final    Calcium 02/21/2024 10.1  8.7 - 10.5 mg/dL Final    Total Protein 02/21/2024 8.0  6.0 - 8.4 g/dL Final    Albumin 02/21/2024 4.1  3.5 - 5.2 g/dL Final    Total Bilirubin 02/21/2024 0.3  0.1 - 1.0 mg/dL Final    Comment: For infants and newborns, interpretation of results should be based  on gestational age, weight and in agreement with clinical  observations.    Premature Infant recommended reference ranges:  Up to 24 hours.............<8.0 mg/dL  Up to 48 hours............<12.0 mg/dL  3-5 days..................<15.0 mg/dL  6-29 days.................<15.0 mg/dL      Alkaline Phosphatase 02/21/2024 76  55 - 135 U/L Final    AST 02/21/2024 18  10 - 40 U/L Final    ALT 02/21/2024 10  10 - 44 U/L Final    eGFR 02/21/2024 >60  >60 mL/min/1.73 m^2 Final    Anion Gap 02/21/2024 9  8 - 16 mmol/L Final    HCG Quant 02/21/2024 [id number]  See Text mIU/mL Final    Comment: Quantitative HCG Reference Ranges:  Males........................<5.0 mIU/mL  Non-Pregnant Females.........<5.0 mIU/mL  Pregnancy:  Weeks post-LMP...............Range (mIU/mL)  1-10  weeks.....................202-231,000  11-15 weeks..................22,536-234,990  16-22 weeks...................8,007-50,064  23-40 weeks...................1,600-49,413    NOTE:  This assay is not FDA approved for tumor screening,   diagnosis, or monitoring.      Group & Rh 02/21/2024 B POS   Final    POC Preg Test, Ur 02/21/2024 Positive (A)  Negative Final     Acceptable 02/21/2024 Yes   Final        Imaging Reviewed    Imaging Results              US OB <14 Wks TransAbd & TransVag, Single Gestation (XPD) (Final result)  Result time 02/21/24 21:46:31      Final result by Bandar Diaz MD (02/21/24 21:46:31)                   Impression:      Single intrauterine pregnancy which  corresponds to a 9 weeks 3 days gestation by crown rump length. Fetal heart rate is 170 BPM.      Electronically signed by: Bandar Diaz MD  Date:    2024  Time:    21:46               Narrative:    EXAMINATION:  US OB <14 WEEKS, TRANSABDOM & TRANSVAG, SINGLE GESTATION (XPD)    CLINICAL HISTORY:  Vag Bleeding;    TECHNIQUE:  Transabdominal sonography of the pelvis was performed, followed by transvaginal sonography to better evaluate the uterus and ovaries.    COMPARISON:  2024.    FINDINGS:  The uterus measures 12 x 8 x 8 cm. Uterine parenchyma is heterogenous in echotexture. In the uterine cavity there is a gestational sac with a mean diameter of 3.9 cm. The fetus measures 2.6 cm by crown rump length and corresponds to a 9 weeks 3 days gestation. Fetal heart rate is 170 BPM. The yolk sac measures 0.3 cm.  Small amount of adjacent subchorionic hemorrhage is seen.    The right ovary measures 5 x 3 x 2 cm. The left ovary measures 5 x 4 x 2 cm. Arterial and venous flow are preserved bilaterally. A suspected corpus luteum cyst measuring 2 cm is seen in the left ovary. No significant free fluid is seen.                                      Medications given in ED    Medications   sodium chloride 0.9% bolus 1,000 mL 1,000 mL (0 mLs Intravenous Stopped 24 6677)   potassium chloride SA CR tablet 40 mEq (40 mEq Oral Given 24 0755)       Note was created using voice recognition software. Note may have occasional typographical errors that may not have been identified and edited despite good jose eduardo initial review prior to signing.         Medical Decision Making:   Initial Assessment:   28 y.o. female  @ 9 2/7 WGA by ROSEMARIE 24 presents emergency department complaining of acute vaginal bleeding that began today. Patient is afebrile, VS WNL, abdominal exam benign.   Differential Diagnosis:   heterotopic pregnancy, early pregnancy loss (complete), incomplete , missed , threatened  , acute blood loss anemia, coagulopathy, hypovolemia, others    Clinical Tests:   Lab Tests: Ordered and Reviewed  Radiological Study: Ordered and Reviewed  ED Management:  Test results, imaging results, outpatient management plan, outpatient OBGYN/PCP follow-up and ED return precautions discussed with patient with others agreement         Vitals:    24 2222 24 2259 24 2302 24 2328   BP: (!) 125/59  129/61    BP Location: Left arm      Patient Position:       Pulse: 98 77 79    Resp:       Temp:    98 °F (36.7 °C)   TempSrc:    Oral   SpO2: 100% 100% 100%    Weight:       Height:             Clinical Impression:  Final diagnoses:  [O20.9] Vaginal bleeding in pregnancy, first trimester (Primary)  [E87.6] Hypokalemia          ED Disposition Condition    Discharge Stable          ED Prescriptions    None       Follow-up Information       Follow up With Specialties Details Why Contact Info    The nearest emergency department.  Go to  As needed, If symptoms worsen     Your OBGYN  Call in 1 day to schedule an appointment, for re-evaluation of today's complaint, and ongoing care              Lindsay Rojo MD  [phone]

## 2024-02-22 NOTE — ED NOTES
Pt is here with complaints of vaginal bleeding and low back pain. Pt has only used on pad that was only about half full and denies abd cramping, chest pains or SOB.    LOC: Pt is awake alert and aware of environment, oriented X3 and speaking appropriately  Appearance: Pt is in no acute distress, Pt is well groomed and clean  Skin: skin is warm and dry with normal turgor, mucus membranes are moist and pink, skin is intact with no bruising or breakdown  Muskuloskeletal: Pt moves all extremities well, there is no obvious swelling or deformities noted, pulses are intact.  Respiratory: Airway is open and patent, respirations are spontaneous and even.  Cardiac: tachycardic, no edema and cap refill is <3sec  Abdomen: soft, non-tender and non-distended  Neuro: Pt follows commands easily and has no obvious deficits

## 2024-04-09 DIAGNOSIS — Z36.89 ENCOUNTER FOR FETAL ANATOMIC SURVEY: Primary | ICD-10-CM

## 2024-04-29 ENCOUNTER — PROCEDURE VISIT (OUTPATIENT)
Dept: MATERNAL FETAL MEDICINE | Facility: CLINIC | Age: 29
End: 2024-04-29
Payer: MEDICAID

## 2024-04-29 DIAGNOSIS — Z36.89 ENCOUNTER FOR FETAL ANATOMIC SURVEY: ICD-10-CM

## 2024-04-29 DIAGNOSIS — Z36.2 ENCOUNTER FOR FOLLOW-UP ULTRASOUND OF FETAL ANATOMY: Primary | ICD-10-CM

## 2024-04-29 PROCEDURE — 76811 OB US DETAILED SNGL FETUS: CPT | Mod: PBBFAC | Performed by: OBSTETRICS & GYNECOLOGY

## 2024-05-29 ENCOUNTER — PROCEDURE VISIT (OUTPATIENT)
Dept: MATERNAL FETAL MEDICINE | Facility: CLINIC | Age: 29
End: 2024-05-29
Payer: MEDICAID

## 2024-05-29 DIAGNOSIS — Z36.89 ENCOUNTER FOR ULTRASOUND TO ASSESS FETAL GROWTH: Primary | ICD-10-CM

## 2024-05-29 DIAGNOSIS — Z36.2 ENCOUNTER FOR FOLLOW-UP ULTRASOUND OF FETAL ANATOMY: ICD-10-CM

## 2024-05-29 PROCEDURE — 76816 OB US FOLLOW-UP PER FETUS: CPT | Mod: PBBFAC | Performed by: OBSTETRICS & GYNECOLOGY

## 2024-06-07 ENCOUNTER — TELEPHONE (OUTPATIENT)
Dept: MATERNAL FETAL MEDICINE | Facility: CLINIC | Age: 29
End: 2024-06-07
Payer: MEDICAID

## 2024-06-07 NOTE — TELEPHONE ENCOUNTER
Call to patient with language line . Scheduled patient for 32 wk growth ultrasound. Gave her date,time and location of appointment.

## 2024-07-30 ENCOUNTER — PROCEDURE VISIT (OUTPATIENT)
Dept: MATERNAL FETAL MEDICINE | Facility: CLINIC | Age: 29
End: 2024-07-30
Payer: MEDICAID

## 2024-07-30 DIAGNOSIS — Z36.89 ENCOUNTER FOR ULTRASOUND TO ASSESS FETAL GROWTH: ICD-10-CM

## 2024-07-30 PROCEDURE — 76816 OB US FOLLOW-UP PER FETUS: CPT | Mod: PBBFAC | Performed by: OBSTETRICS & GYNECOLOGY

## 2024-07-30 PROCEDURE — 76819 FETAL BIOPHYS PROFIL W/O NST: CPT | Mod: 26,S$PBB,, | Performed by: OBSTETRICS & GYNECOLOGY

## 2024-08-14 ENCOUNTER — INITIAL PRENATAL (OUTPATIENT)
Dept: OBSTETRICS AND GYNECOLOGY | Facility: CLINIC | Age: 29
End: 2024-08-14
Payer: MEDICAID

## 2024-08-14 VITALS
DIASTOLIC BLOOD PRESSURE: 78 MMHG | BODY MASS INDEX: 47.36 KG/M2 | SYSTOLIC BLOOD PRESSURE: 126 MMHG | WEIGHT: 267.31 LBS

## 2024-08-14 DIAGNOSIS — Z34.83 ENCOUNTER FOR SUPERVISION OF OTHER NORMAL PREGNANCY IN THIRD TRIMESTER: Primary | ICD-10-CM

## 2024-08-14 DIAGNOSIS — Z3A.34 34 WEEKS GESTATION OF PREGNANCY: ICD-10-CM

## 2024-08-14 PROCEDURE — 99212 OFFICE O/P EST SF 10 MIN: CPT | Mod: PBBFAC,TH | Performed by: STUDENT IN AN ORGANIZED HEALTH CARE EDUCATION/TRAINING PROGRAM

## 2024-08-14 PROCEDURE — 99202 OFFICE O/P NEW SF 15 MIN: CPT | Mod: TH,S$PBB,, | Performed by: STUDENT IN AN ORGANIZED HEALTH CARE EDUCATION/TRAINING PROGRAM

## 2024-08-14 PROCEDURE — 99999 PR PBB SHADOW E&M-EST. PATIENT-LVL II: CPT | Mod: PBBFAC,,, | Performed by: STUDENT IN AN ORGANIZED HEALTH CARE EDUCATION/TRAINING PROGRAM

## 2024-08-14 NOTE — PROGRESS NOTES
Moraima Gao is a 29 y.o.  at 34w3d (Estimated Date of Delivery: 24) presenting for ALYSA with her     Giovanny Edmonds Int # [id number]    Today pt is feeling well  Denies vaginal bleeding, loss of fluid, contractions, or decreased fetal movement    PNL visible in CareEverywhere  24: HBsAg neg, HCV neg, HIV neg, Neg Gc/Ct, Rimm  Documented normal GTT and Tdap    History reviewed. No pertinent past medical history.    OB History    Para Term  AB Living   3 1 1   1 1   SAB IAB Ectopic Multiple Live Births   1       1      # Outcome Date GA Lbr Kaushal/2nd Weight Sex Type Anes PTL Lv   3 Current            2 Term 19   3.58 kg (7 lb 14.3 oz) M Vag-Spont      1 SAB      SAB   FD       Review of patient's allergies indicates:  No Known Allergies     Vitals  BP: 126/78  Weight: 121.2 kg (267 lb 4.9 oz)  Prenatal  Fundal Height (cm): 37 cm  Fetal Heart Rate: 141  Movement: Present     Assessment & Plan    - Continue PNV   - Discussed delivery hospital and where to present for emergency evaluation  - Reviewed pregnancy history    RTC in 2 weeks    Total time spent on visit was 20 minutes. This includes face to face time and non-face to face time preparing to see the patient (eg, review of tests), use of video or in-person , obtaining and/or reviewing separately obtained history, documenting clinical information in the electronic or other health record, independently interpreting results and communicating results to the patient/family/caregiver, or care coordination.

## 2024-08-18 ENCOUNTER — PATIENT MESSAGE (OUTPATIENT)
Dept: OTHER | Facility: OTHER | Age: 29
End: 2024-08-18
Payer: MEDICAID

## 2024-08-28 ENCOUNTER — LAB VISIT (OUTPATIENT)
Dept: LAB | Facility: HOSPITAL | Age: 29
End: 2024-08-28
Attending: STUDENT IN AN ORGANIZED HEALTH CARE EDUCATION/TRAINING PROGRAM
Payer: MEDICAID

## 2024-08-28 ENCOUNTER — ROUTINE PRENATAL (OUTPATIENT)
Dept: OBSTETRICS AND GYNECOLOGY | Facility: CLINIC | Age: 29
End: 2024-08-28
Payer: MEDICAID

## 2024-08-28 VITALS — SYSTOLIC BLOOD PRESSURE: 124 MMHG | WEIGHT: 267 LBS | DIASTOLIC BLOOD PRESSURE: 72 MMHG | BODY MASS INDEX: 47.3 KG/M2

## 2024-08-28 DIAGNOSIS — Z3A.36 36 WEEKS GESTATION OF PREGNANCY: ICD-10-CM

## 2024-08-28 DIAGNOSIS — Z34.83 ENCOUNTER FOR SUPERVISION OF OTHER NORMAL PREGNANCY IN THIRD TRIMESTER: Primary | ICD-10-CM

## 2024-08-28 DIAGNOSIS — Z34.83 ENCOUNTER FOR SUPERVISION OF OTHER NORMAL PREGNANCY IN THIRD TRIMESTER: ICD-10-CM

## 2024-08-28 LAB
BASOPHILS # BLD AUTO: 0.02 K/UL (ref 0–0.2)
BASOPHILS NFR BLD: 0.3 % (ref 0–1.9)
DIFFERENTIAL METHOD BLD: ABNORMAL
EOSINOPHIL # BLD AUTO: 0.1 K/UL (ref 0–0.5)
EOSINOPHIL NFR BLD: 0.8 % (ref 0–8)
ERYTHROCYTE [DISTWIDTH] IN BLOOD BY AUTOMATED COUNT: 13.8 % (ref 11.5–14.5)
HCT VFR BLD AUTO: 36.8 % (ref 37–48.5)
HGB BLD-MCNC: 11.8 G/DL (ref 12–16)
HIV 1+2 AB+HIV1 P24 AG SERPL QL IA: NORMAL
IMM GRANULOCYTES # BLD AUTO: 0.04 K/UL (ref 0–0.04)
IMM GRANULOCYTES NFR BLD AUTO: 0.7 % (ref 0–0.5)
LYMPHOCYTES # BLD AUTO: 1.8 K/UL (ref 1–4.8)
LYMPHOCYTES NFR BLD: 29.7 % (ref 18–48)
MCH RBC QN AUTO: 26.3 PG (ref 27–31)
MCHC RBC AUTO-ENTMCNC: 32.1 G/DL (ref 32–36)
MCV RBC AUTO: 82 FL (ref 82–98)
MONOCYTES # BLD AUTO: 0.7 K/UL (ref 0.3–1)
MONOCYTES NFR BLD: 10.8 % (ref 4–15)
NEUTROPHILS # BLD AUTO: 3.5 K/UL (ref 1.8–7.7)
NEUTROPHILS NFR BLD: 57.7 % (ref 38–73)
NRBC BLD-RTO: 0 /100 WBC
PLATELET # BLD AUTO: 196 K/UL (ref 150–450)
PMV BLD AUTO: 10 FL (ref 9.2–12.9)
RBC # BLD AUTO: 4.49 M/UL (ref 4–5.4)
TREPONEMA PALLIDUM IGG+IGM AB [PRESENCE] IN SERUM OR PLASMA BY IMMUNOASSAY: NONREACTIVE
WBC # BLD AUTO: 6.03 K/UL (ref 3.9–12.7)

## 2024-08-28 PROCEDURE — 87653 STREP B DNA AMP PROBE: CPT | Performed by: STUDENT IN AN ORGANIZED HEALTH CARE EDUCATION/TRAINING PROGRAM

## 2024-08-28 PROCEDURE — 85025 COMPLETE CBC W/AUTO DIFF WBC: CPT | Performed by: STUDENT IN AN ORGANIZED HEALTH CARE EDUCATION/TRAINING PROGRAM

## 2024-08-28 PROCEDURE — 99212 OFFICE O/P EST SF 10 MIN: CPT | Mod: PBBFAC,TH | Performed by: STUDENT IN AN ORGANIZED HEALTH CARE EDUCATION/TRAINING PROGRAM

## 2024-08-28 PROCEDURE — 86593 SYPHILIS TEST NON-TREP QUANT: CPT | Performed by: STUDENT IN AN ORGANIZED HEALTH CARE EDUCATION/TRAINING PROGRAM

## 2024-08-28 PROCEDURE — 36415 COLL VENOUS BLD VENIPUNCTURE: CPT | Performed by: STUDENT IN AN ORGANIZED HEALTH CARE EDUCATION/TRAINING PROGRAM

## 2024-08-28 PROCEDURE — 99212 OFFICE O/P EST SF 10 MIN: CPT | Mod: S$PBB,TH,, | Performed by: STUDENT IN AN ORGANIZED HEALTH CARE EDUCATION/TRAINING PROGRAM

## 2024-08-28 PROCEDURE — 99999 PR PBB SHADOW E&M-EST. PATIENT-LVL II: CPT | Mod: PBBFAC,,, | Performed by: STUDENT IN AN ORGANIZED HEALTH CARE EDUCATION/TRAINING PROGRAM

## 2024-08-28 PROCEDURE — 87389 HIV-1 AG W/HIV-1&-2 AB AG IA: CPT | Performed by: STUDENT IN AN ORGANIZED HEALTH CARE EDUCATION/TRAINING PROGRAM

## 2024-08-28 RX ORDER — PROMETHAZINE HYDROCHLORIDE 25 MG/1
25 TABLET ORAL EVERY 6 HOURS PRN
COMMUNITY
Start: 2024-03-12

## 2024-08-28 NOTE — PROGRESS NOTES
Moraima Gao is a 29 y.o.  at 36w3d (Estimated Date of Delivery: 24) presenting for ALYSA Edmonds Int #[id number]    Today doing well but having stomach pain  Denies vaginal bleeding, loss of fluid, contractions, or decreased fetal movement    History reviewed. No pertinent past medical history.    OB History    Para Term  AB Living   3 1 1   1 1   SAB IAB Ectopic Multiple Live Births   1       1      # Outcome Date GA Lbr Kaushal/2nd Weight Sex Type Anes PTL Lv   3 Current            2 Term 19   3.58 kg (7 lb 14.3 oz) M Vag-Spont      1 SAB      SAB   FD       Review of patient's allergies indicates:  No Known Allergies     Vitals  BP: 124/72  Weight: 121.1 kg (266 lb 15.6 oz)  Prenatal  Fundal Height (cm): 38 cm  Fetal Heart Rate: 140  Movement: Present  Cervical Exam  Dilation: 1  Effacement (%): 10  Station: -2  Station (Labor Curve): 7 cm  Dilation/Effacement/Station  Dilation: 1  Effacement (%): 10  Station: -2     Assessment & Plan    - Continue PNV   - GBS today, 36 week labs ordered     RTC in 1 weeks    Total time spent on visit was 11 minutes. This includes face to face time and non-face to face time preparing to see the patient (eg, review of tests), use of video or in-person , obtaining and/or reviewing separately obtained history, documenting clinical information in the electronic or other health record, independently interpreting results and communicating results to the patient/family/caregiver, or care coordination.

## 2024-08-30 LAB — GROUP B STREPTOCOCCUS, PCR: NEGATIVE

## 2024-09-04 ENCOUNTER — ROUTINE PRENATAL (OUTPATIENT)
Dept: OBSTETRICS AND GYNECOLOGY | Facility: CLINIC | Age: 29
End: 2024-09-04
Payer: MEDICAID

## 2024-09-04 VITALS
BODY MASS INDEX: 47.07 KG/M2 | DIASTOLIC BLOOD PRESSURE: 74 MMHG | WEIGHT: 265.63 LBS | SYSTOLIC BLOOD PRESSURE: 120 MMHG

## 2024-09-04 DIAGNOSIS — K21.9 GASTROESOPHAGEAL REFLUX DISEASE, UNSPECIFIED WHETHER ESOPHAGITIS PRESENT: ICD-10-CM

## 2024-09-04 DIAGNOSIS — Z3A.37 37 WEEKS GESTATION OF PREGNANCY: ICD-10-CM

## 2024-09-04 DIAGNOSIS — Z34.83 ENCOUNTER FOR SUPERVISION OF OTHER NORMAL PREGNANCY IN THIRD TRIMESTER: Primary | ICD-10-CM

## 2024-09-04 PROCEDURE — 99212 OFFICE O/P EST SF 10 MIN: CPT | Mod: TH,S$PBB,, | Performed by: STUDENT IN AN ORGANIZED HEALTH CARE EDUCATION/TRAINING PROGRAM

## 2024-09-04 PROCEDURE — 99212 OFFICE O/P EST SF 10 MIN: CPT | Mod: PBBFAC,TH | Performed by: STUDENT IN AN ORGANIZED HEALTH CARE EDUCATION/TRAINING PROGRAM

## 2024-09-04 PROCEDURE — 99999 PR PBB SHADOW E&M-EST. PATIENT-LVL II: CPT | Mod: PBBFAC,,, | Performed by: STUDENT IN AN ORGANIZED HEALTH CARE EDUCATION/TRAINING PROGRAM

## 2024-09-04 RX ORDER — FAMOTIDINE 20 MG/1
20 TABLET, FILM COATED ORAL DAILY
Qty: 30 TABLET | Refills: 2 | Status: SHIPPED | OUTPATIENT
Start: 2024-09-04 | End: 2025-09-04

## 2024-09-04 NOTE — PROGRESS NOTES
Moraima Gao is a 29 y.o.  at 37w3d (Estimated Date of Delivery: 24) presenting for ALYSA Edmonds Int# [id number]    Today reports no issues except for GERD  Denies vaginal bleeding, loss of fluid, contractions, or decreased fetal movement    History reviewed. No pertinent past medical history.    OB History    Para Term  AB Living   3 1 1   1 1   SAB IAB Ectopic Multiple Live Births   1       1      # Outcome Date GA Lbr Kaushal/2nd Weight Sex Type Anes PTL Lv   3 Current            2 Term 19   3.58 kg (7 lb 14.3 oz) M Vag-Spont      1 SAB      SAB   FD       Review of patient's allergies indicates:  No Known Allergies     Vitals  BP: 120/74  Weight: 120.5 kg (265 lb 10.5 oz)  Prenatal  Fundal Height (cm): 37 cm  Fetal Heart Rate: 135  Movement: Present     Assessment & Plan    - Continue PNV   - GBS neg  - Consents signed today    RTC in 1 weeks    Total time spent on visit was 15 minutes. This includes face to face time and non-face to face time preparing to see the patient (eg, review of tests), use of video or in-person , obtaining and/or reviewing separately obtained history, documenting clinical information in the electronic or other health record, independently interpreting results and communicating results to the patient/family/caregiver, or care coordination.

## 2024-09-18 ENCOUNTER — ROUTINE PRENATAL (OUTPATIENT)
Dept: OBSTETRICS AND GYNECOLOGY | Facility: CLINIC | Age: 29
End: 2024-09-18
Payer: MEDICAID

## 2024-09-18 VITALS
BODY MASS INDEX: 48.03 KG/M2 | WEIGHT: 271.06 LBS | DIASTOLIC BLOOD PRESSURE: 80 MMHG | SYSTOLIC BLOOD PRESSURE: 126 MMHG

## 2024-09-18 DIAGNOSIS — R05.8 PRODUCTIVE COUGH: ICD-10-CM

## 2024-09-18 DIAGNOSIS — R51.9 PREGNANCY HEADACHE IN THIRD TRIMESTER: Primary | ICD-10-CM

## 2024-09-18 DIAGNOSIS — O26.893 PREGNANCY HEADACHE IN THIRD TRIMESTER: Primary | ICD-10-CM

## 2024-09-18 PROCEDURE — 99999 PR PBB SHADOW E&M-EST. PATIENT-LVL II: CPT | Mod: PBBFAC,,, | Performed by: STUDENT IN AN ORGANIZED HEALTH CARE EDUCATION/TRAINING PROGRAM

## 2024-09-18 PROCEDURE — 99213 OFFICE O/P EST LOW 20 MIN: CPT | Mod: TH,S$PBB,, | Performed by: STUDENT IN AN ORGANIZED HEALTH CARE EDUCATION/TRAINING PROGRAM

## 2024-09-18 PROCEDURE — 99212 OFFICE O/P EST SF 10 MIN: CPT | Mod: PBBFAC,TH | Performed by: STUDENT IN AN ORGANIZED HEALTH CARE EDUCATION/TRAINING PROGRAM

## 2024-09-18 RX ORDER — GUAIFENESIN 600 MG/1
1200 TABLET, EXTENDED RELEASE ORAL 2 TIMES DAILY
Qty: 40 TABLET | Refills: 0 | Status: SHIPPED | OUTPATIENT
Start: 2024-09-18 | End: 2024-09-28

## 2024-09-18 RX ORDER — ACETAMINOPHEN 500 MG
500 TABLET ORAL EVERY 6 HOURS PRN
Qty: 30 TABLET | Refills: 0 | Status: SHIPPED | OUTPATIENT
Start: 2024-09-18

## 2024-09-18 NOTE — PROGRESS NOTES
Moraima Gao is a 29 y.o.  at 39w3d (Estimated Date of Delivery: 24) presenting for ALYSA    Today reporting some headache and cold symptoms.   Denies vaginal bleeding, loss of fluid, contractions, or decreased fetal movement    Giovanny Edmonds #[id number]    History reviewed. No pertinent past medical history.    OB History    Para Term  AB Living   3 1 1   1 1   SAB IAB Ectopic Multiple Live Births   1       1      # Outcome Date GA Lbr Kaushal/2nd Weight Sex Type Anes PTL Lv   3 Current            2 Term 19   3.58 kg (7 lb 14.3 oz) M Vag-Spont      1 SAB      SAB   FD       Review of patient's allergies indicates:  No Known Allergies     Vitals  BP: 126/80  Weight: 123 kg (271 lb 0.9 oz)  Prenatal  Fundal Height (cm): 40 cm  Fetal Heart Rate: 145  Movement: Present  Cervical Exam  Dilation: 1  Effacement (%): 20  Station: -3  Station (Labor Curve): 8 cm  Dilation/Effacement/Station  Dilation: 1  Effacement (%): 20  Station: -3     Assessment & Plan    - Continue PNV   - tylenol and mucinex sent to pharmacy  - Desires spontaneous labor, will accept IOL at 41 weeks    RTC in 1 weeks    Total time spent on visit was 28 minutes. This includes face to face time and non-face to face time preparing to see the patient (eg, review of tests), use of video or in-person , obtaining and/or reviewing separately obtained history, documenting clinical information in the electronic or other health record, independently interpreting results and communicating results to the patient/family/caregiver, or care coordination.

## 2024-09-25 ENCOUNTER — ROUTINE PRENATAL (OUTPATIENT)
Dept: OBSTETRICS AND GYNECOLOGY | Facility: CLINIC | Age: 29
End: 2024-09-25
Payer: MEDICAID

## 2024-09-25 VITALS
BODY MASS INDEX: 48.22 KG/M2 | DIASTOLIC BLOOD PRESSURE: 78 MMHG | SYSTOLIC BLOOD PRESSURE: 118 MMHG | WEIGHT: 272.19 LBS

## 2024-09-25 DIAGNOSIS — Z3A.40 40 WEEKS GESTATION OF PREGNANCY: ICD-10-CM

## 2024-09-25 DIAGNOSIS — Z34.83 ENCOUNTER FOR SUPERVISION OF OTHER NORMAL PREGNANCY IN THIRD TRIMESTER: Primary | ICD-10-CM

## 2024-09-25 PROBLEM — Z34.90 TERM PREGNANCY: Status: ACTIVE | Noted: 2024-09-25

## 2024-09-25 PROCEDURE — 99212 OFFICE O/P EST SF 10 MIN: CPT | Mod: TH,S$PBB,, | Performed by: STUDENT IN AN ORGANIZED HEALTH CARE EDUCATION/TRAINING PROGRAM

## 2024-09-25 PROCEDURE — 99212 OFFICE O/P EST SF 10 MIN: CPT | Mod: PBBFAC,TH | Performed by: STUDENT IN AN ORGANIZED HEALTH CARE EDUCATION/TRAINING PROGRAM

## 2024-09-25 PROCEDURE — 99999 PR PBB SHADOW E&M-EST. PATIENT-LVL II: CPT | Mod: PBBFAC,,, | Performed by: STUDENT IN AN ORGANIZED HEALTH CARE EDUCATION/TRAINING PROGRAM

## 2024-09-25 NOTE — PROGRESS NOTES
Moraima Gao is a 29 y.o.  at 40w3d (Estimated Date of Delivery: 24) presenting for ALYSA    Today doing well but having trouble sleeping. Does not want any medicine for this    Denies vaginal bleeding, loss of fluid, contractions, or decreased fetal movement    Giovanny Edmonds Int # [id number]    History reviewed. No pertinent past medical history.    OB History    Para Term  AB Living   3 1 1   1 1   SAB IAB Ectopic Multiple Live Births   1       1      # Outcome Date GA Lbr Kaushal/2nd Weight Sex Type Anes PTL Lv   3 Current            2 Term 19   3.58 kg (7 lb 14.3 oz) M Vag-Spont      1 SAB      SAB   FD       Review of patient's allergies indicates:  No Known Allergies     Vitals  BP: 118/78  Weight: 123.5 kg (272 lb 2.5 oz)  Prenatal  Fundal Height (cm): 42 cm  Fetal Heart Rate: 140  Movement: Present     Assessment & Plan    - Continue PNV   - discussed IOL Mon AM  - discussed relaxation/mindfulness   - reviewed labor precautions    Total time spent on visit was 10 minutes. This includes face to face time and non-face to face time preparing to see the patient (eg, review of tests), use of video or in-person , obtaining and/or reviewing separately obtained history, documenting clinical information in the electronic or other health record, independently interpreting results and communicating results to the patient/family/caregiver, or care coordination.

## 2024-09-26 ENCOUNTER — HOSPITAL ENCOUNTER (INPATIENT)
Facility: HOSPITAL | Age: 29
LOS: 2 days | Discharge: HOME | End: 2024-09-28
Attending: STUDENT IN AN ORGANIZED HEALTH CARE EDUCATION/TRAINING PROGRAM | Admitting: STUDENT IN AN ORGANIZED HEALTH CARE EDUCATION/TRAINING PROGRAM
Payer: MEDICAID

## 2024-09-26 ENCOUNTER — ANESTHESIA EVENT (OUTPATIENT)
Dept: OBSTETRICS AND GYNECOLOGY | Facility: HOSPITAL | Age: 29
End: 2024-09-26

## 2024-09-26 ENCOUNTER — ANESTHESIA (OUTPATIENT)
Dept: OBSTETRICS AND GYNECOLOGY | Facility: HOSPITAL | Age: 29
End: 2024-09-26
Payer: MEDICAID

## 2024-09-26 ENCOUNTER — ANESTHESIA EVENT (OUTPATIENT)
Dept: OBSTETRICS AND GYNECOLOGY | Facility: HOSPITAL | Age: 29
End: 2024-09-26
Payer: MEDICAID

## 2024-09-26 ENCOUNTER — ANESTHESIA (OUTPATIENT)
Dept: OBSTETRICS AND GYNECOLOGY | Facility: HOSPITAL | Age: 29
End: 2024-09-26

## 2024-09-26 DIAGNOSIS — Z34.90 TERM PREGNANCY: ICD-10-CM

## 2024-09-26 DIAGNOSIS — O16.9 ELEVATED BLOOD PRESSURE AFFECTING PREGNANCY, ANTEPARTUM: ICD-10-CM

## 2024-09-26 LAB
ABO + RH BLD: NORMAL
ALBUMIN SERPL BCP-MCNC: 2.9 G/DL (ref 3.5–5.2)
ALP SERPL-CCNC: 270 U/L (ref 55–135)
ALT SERPL W/O P-5'-P-CCNC: 10 U/L (ref 10–44)
ANION GAP SERPL CALC-SCNC: 9 MMOL/L (ref 8–16)
AST SERPL-CCNC: 12 U/L (ref 10–40)
BASOPHILS # BLD AUTO: 0.01 K/UL (ref 0–0.2)
BASOPHILS NFR BLD: 0.2 % (ref 0–1.9)
BILIRUB SERPL-MCNC: 0.4 MG/DL (ref 0.1–1)
BLD GP AB SCN CELLS X3 SERPL QL: NORMAL
BUN SERPL-MCNC: 8 MG/DL (ref 6–20)
CALCIUM SERPL-MCNC: 9.5 MG/DL (ref 8.7–10.5)
CHLORIDE SERPL-SCNC: 107 MMOL/L (ref 95–110)
CO2 SERPL-SCNC: 19 MMOL/L (ref 23–29)
CREAT SERPL-MCNC: 0.7 MG/DL (ref 0.5–1.4)
CREAT UR-MCNC: 79.4 MG/DL (ref 15–325)
DIFFERENTIAL METHOD BLD: ABNORMAL
EOSINOPHIL # BLD AUTO: 0 K/UL (ref 0–0.5)
EOSINOPHIL NFR BLD: 0.3 % (ref 0–8)
ERYTHROCYTE [DISTWIDTH] IN BLOOD BY AUTOMATED COUNT: 14.2 % (ref 11.5–14.5)
EST. GFR  (NO RACE VARIABLE): >60 ML/MIN/1.73 M^2
GLUCOSE SERPL-MCNC: 115 MG/DL (ref 70–110)
HAV IGM SERPL QL IA: NORMAL
HBV CORE IGM SERPL QL IA: NORMAL
HBV SURFACE AG SERPL QL IA: NORMAL
HCT VFR BLD AUTO: 37.3 % (ref 37–48.5)
HCV AB SERPL QL IA: NORMAL
HGB BLD-MCNC: 12.4 G/DL (ref 12–16)
IMM GRANULOCYTES # BLD AUTO: 0.03 K/UL (ref 0–0.04)
IMM GRANULOCYTES NFR BLD AUTO: 0.5 % (ref 0–0.5)
LYMPHOCYTES # BLD AUTO: 1.4 K/UL (ref 1–4.8)
LYMPHOCYTES NFR BLD: 24.5 % (ref 18–48)
MCH RBC QN AUTO: 26.6 PG (ref 27–31)
MCHC RBC AUTO-ENTMCNC: 33.2 G/DL (ref 32–36)
MCV RBC AUTO: 80 FL (ref 82–98)
MONOCYTES # BLD AUTO: 0.5 K/UL (ref 0.3–1)
MONOCYTES NFR BLD: 9.3 % (ref 4–15)
NEUTROPHILS # BLD AUTO: 3.8 K/UL (ref 1.8–7.7)
NEUTROPHILS NFR BLD: 65.2 % (ref 38–73)
NRBC BLD-RTO: 0 /100 WBC
PLATELET # BLD AUTO: 176 K/UL (ref 150–450)
PMV BLD AUTO: 10.2 FL (ref 9.2–12.9)
POTASSIUM SERPL-SCNC: 3.5 MMOL/L (ref 3.5–5.1)
PROT SERPL-MCNC: 6.9 G/DL (ref 6–8.4)
PROT UR-MCNC: 13 MG/DL
PROT/CREAT UR: 0.16 MG/G{CREAT} (ref 0–0.2)
RBC # BLD AUTO: 4.66 M/UL (ref 4–5.4)
SODIUM SERPL-SCNC: 135 MMOL/L (ref 136–145)
WBC # BLD AUTO: 5.79 K/UL (ref 3.9–12.7)

## 2024-09-26 PROCEDURE — 25000003 PHARM REV CODE 250: Performed by: STUDENT IN AN ORGANIZED HEALTH CARE EDUCATION/TRAINING PROGRAM

## 2024-09-26 PROCEDURE — 59025 FETAL NON-STRESS TEST: CPT

## 2024-09-26 PROCEDURE — 10907ZC DRAINAGE OF AMNIOTIC FLUID, THERAPEUTIC FROM PRODUCTS OF CONCEPTION, VIA NATURAL OR ARTIFICIAL OPENING: ICD-10-PCS | Performed by: STUDENT IN AN ORGANIZED HEALTH CARE EDUCATION/TRAINING PROGRAM

## 2024-09-26 PROCEDURE — 63600175 PHARM REV CODE 636 W HCPCS: Performed by: STUDENT IN AN ORGANIZED HEALTH CARE EDUCATION/TRAINING PROGRAM

## 2024-09-26 PROCEDURE — 85025 COMPLETE CBC W/AUTO DIFF WBC: CPT | Performed by: STUDENT IN AN ORGANIZED HEALTH CARE EDUCATION/TRAINING PROGRAM

## 2024-09-26 PROCEDURE — 11000001 HC ACUTE MED/SURG PRIVATE ROOM

## 2024-09-26 PROCEDURE — 62326 NJX INTERLAMINAR LMBR/SAC: CPT | Performed by: ANESTHESIOLOGY

## 2024-09-26 PROCEDURE — 99222 1ST HOSP IP/OBS MODERATE 55: CPT | Mod: ,,, | Performed by: STUDENT IN AN ORGANIZED HEALTH CARE EDUCATION/TRAINING PROGRAM

## 2024-09-26 PROCEDURE — 86900 BLOOD TYPING SEROLOGIC ABO: CPT | Performed by: STUDENT IN AN ORGANIZED HEALTH CARE EDUCATION/TRAINING PROGRAM

## 2024-09-26 PROCEDURE — 72100002 HC LABOR CARE, 1ST 8 HOURS

## 2024-09-26 PROCEDURE — 27200710 HC EPIDURAL INFUSION PUMP SET: Performed by: ANESTHESIOLOGY

## 2024-09-26 PROCEDURE — C1751 CATH, INF, PER/CENT/MIDLINE: HCPCS | Performed by: ANESTHESIOLOGY

## 2024-09-26 PROCEDURE — 86762 RUBELLA ANTIBODY: CPT | Performed by: STUDENT IN AN ORGANIZED HEALTH CARE EDUCATION/TRAINING PROGRAM

## 2024-09-26 PROCEDURE — 51702 INSERT TEMP BLADDER CATH: CPT

## 2024-09-26 PROCEDURE — 72200006 HC VAGINAL DELIVERY LEVEL III

## 2024-09-26 PROCEDURE — 25000003 PHARM REV CODE 250: Performed by: ANESTHESIOLOGY

## 2024-09-26 PROCEDURE — 82570 ASSAY OF URINE CREATININE: CPT | Performed by: STUDENT IN AN ORGANIZED HEALTH CARE EDUCATION/TRAINING PROGRAM

## 2024-09-26 PROCEDURE — 99211 OFF/OP EST MAY X REQ PHY/QHP: CPT

## 2024-09-26 PROCEDURE — 86850 RBC ANTIBODY SCREEN: CPT | Performed by: STUDENT IN AN ORGANIZED HEALTH CARE EDUCATION/TRAINING PROGRAM

## 2024-09-26 PROCEDURE — 80074 ACUTE HEPATITIS PANEL: CPT | Performed by: STUDENT IN AN ORGANIZED HEALTH CARE EDUCATION/TRAINING PROGRAM

## 2024-09-26 PROCEDURE — 80053 COMPREHEN METABOLIC PANEL: CPT | Performed by: STUDENT IN AN ORGANIZED HEALTH CARE EDUCATION/TRAINING PROGRAM

## 2024-09-26 PROCEDURE — 0KQM0ZZ REPAIR PERINEUM MUSCLE, OPEN APPROACH: ICD-10-PCS | Performed by: STUDENT IN AN ORGANIZED HEALTH CARE EDUCATION/TRAINING PROGRAM

## 2024-09-26 PROCEDURE — 59409 OBSTETRICAL CARE: CPT | Mod: GB,,, | Performed by: STUDENT IN AN ORGANIZED HEALTH CARE EDUCATION/TRAINING PROGRAM

## 2024-09-26 RX ORDER — METHYLERGONOVINE MALEATE 0.2 MG/ML
200 INJECTION INTRAVENOUS ONCE AS NEEDED
Status: DISCONTINUED | OUTPATIENT
Start: 2024-09-26 | End: 2024-09-28 | Stop reason: HOSPADM

## 2024-09-26 RX ORDER — SODIUM CHLORIDE 9 MG/ML
INJECTION, SOLUTION INTRAVENOUS
Status: DISCONTINUED | OUTPATIENT
Start: 2024-09-26 | End: 2024-09-28 | Stop reason: HOSPADM

## 2024-09-26 RX ORDER — DOCUSATE SODIUM 100 MG/1
200 CAPSULE, LIQUID FILLED ORAL 2 TIMES DAILY PRN
Status: DISCONTINUED | OUTPATIENT
Start: 2024-09-26 | End: 2024-09-28 | Stop reason: HOSPADM

## 2024-09-26 RX ORDER — METOCLOPRAMIDE HYDROCHLORIDE 5 MG/ML
5 INJECTION INTRAMUSCULAR; INTRAVENOUS EVERY 6 HOURS PRN
Status: DISCONTINUED | OUTPATIENT
Start: 2024-09-26 | End: 2024-09-28 | Stop reason: HOSPADM

## 2024-09-26 RX ORDER — OXYTOCIN-SODIUM CHLORIDE 0.9% IV SOLN 30 UNIT/500ML 30-0.9/5 UT/ML-%
10 SOLUTION INTRAVENOUS ONCE AS NEEDED
Status: COMPLETED | OUTPATIENT
Start: 2024-09-26 | End: 2024-09-26

## 2024-09-26 RX ORDER — MISOPROSTOL 200 UG/1
800 TABLET ORAL ONCE AS NEEDED
Status: DISCONTINUED | OUTPATIENT
Start: 2024-09-26 | End: 2024-09-28 | Stop reason: HOSPADM

## 2024-09-26 RX ORDER — CARBOPROST TROMETHAMINE 250 UG/ML
250 INJECTION, SOLUTION INTRAMUSCULAR
Status: DISCONTINUED | OUTPATIENT
Start: 2024-09-26 | End: 2024-09-28 | Stop reason: HOSPADM

## 2024-09-26 RX ORDER — ACETAMINOPHEN 325 MG/1
650 TABLET ORAL EVERY 6 HOURS SCHEDULED
Status: DISCONTINUED | OUTPATIENT
Start: 2024-09-26 | End: 2024-09-28 | Stop reason: HOSPADM

## 2024-09-26 RX ORDER — IBUPROFEN 600 MG/1
600 TABLET ORAL EVERY 6 HOURS
Status: DISCONTINUED | OUTPATIENT
Start: 2024-09-26 | End: 2024-09-28 | Stop reason: HOSPADM

## 2024-09-26 RX ORDER — ACETAMINOPHEN 325 MG/1
650 TABLET ORAL EVERY 6 HOURS PRN
Status: DISCONTINUED | OUTPATIENT
Start: 2024-09-26 | End: 2024-09-28 | Stop reason: HOSPADM

## 2024-09-26 RX ORDER — MUPIROCIN 20 MG/G
OINTMENT TOPICAL
Status: DISCONTINUED | OUTPATIENT
Start: 2024-09-26 | End: 2024-09-28 | Stop reason: HOSPADM

## 2024-09-26 RX ORDER — PRENATAL WITH FERROUS FUM AND FOLIC ACID 3080; 920; 120; 400; 22; 1.84; 3; 20; 10; 1; 12; 200; 27; 25; 2 [IU]/1; [IU]/1; MG/1; [IU]/1; MG/1; MG/1; MG/1; MG/1; MG/1; MG/1; UG/1; MG/1; MG/1; MG/1; MG/1
1 TABLET ORAL DAILY
Status: DISCONTINUED | OUTPATIENT
Start: 2024-09-27 | End: 2024-09-28 | Stop reason: HOSPADM

## 2024-09-26 RX ORDER — OXYTOCIN-SODIUM CHLORIDE 0.9% IV SOLN 30 UNIT/500ML 30-0.9/5 UT/ML-%
95 SOLUTION INTRAVENOUS CONTINUOUS PRN
Status: DISCONTINUED | OUTPATIENT
Start: 2024-09-26 | End: 2024-09-28 | Stop reason: HOSPADM

## 2024-09-26 RX ORDER — SIMETHICONE 80 MG
1 TABLET,CHEWABLE ORAL EVERY 6 HOURS PRN
Status: DISCONTINUED | OUTPATIENT
Start: 2024-09-26 | End: 2024-09-28 | Stop reason: HOSPADM

## 2024-09-26 RX ORDER — TRANEXAMIC ACID 10 MG/ML
1000 INJECTION, SOLUTION INTRAVENOUS EVERY 30 MIN PRN
Status: DISCONTINUED | OUTPATIENT
Start: 2024-09-26 | End: 2024-09-28 | Stop reason: HOSPADM

## 2024-09-26 RX ORDER — OXYTOCIN 10 [USP'U]/ML
10 INJECTION, SOLUTION INTRAMUSCULAR; INTRAVENOUS ONCE AS NEEDED
Status: DISCONTINUED | OUTPATIENT
Start: 2024-09-26 | End: 2024-09-26

## 2024-09-26 RX ORDER — LIDOCAINE HYDROCHLORIDE 10 MG/ML
10 INJECTION, SOLUTION INFILTRATION; PERINEURAL ONCE AS NEEDED
Status: DISCONTINUED | OUTPATIENT
Start: 2024-09-26 | End: 2024-09-28 | Stop reason: HOSPADM

## 2024-09-26 RX ORDER — SODIUM CHLORIDE, SODIUM LACTATE, POTASSIUM CHLORIDE, CALCIUM CHLORIDE 600; 310; 30; 20 MG/100ML; MG/100ML; MG/100ML; MG/100ML
INJECTION, SOLUTION INTRAVENOUS CONTINUOUS
Status: DISCONTINUED | OUTPATIENT
Start: 2024-09-26 | End: 2024-09-28 | Stop reason: HOSPADM

## 2024-09-26 RX ORDER — OXYCODONE AND ACETAMINOPHEN 10; 325 MG/1; MG/1
1 TABLET ORAL EVERY 4 HOURS PRN
Status: DISPENSED | OUTPATIENT
Start: 2024-09-26 | End: 2024-09-27

## 2024-09-26 RX ORDER — OXYTOCIN-SODIUM CHLORIDE 0.9% IV SOLN 30 UNIT/500ML 30-0.9/5 UT/ML-%
95 SOLUTION INTRAVENOUS ONCE AS NEEDED
Status: COMPLETED | OUTPATIENT
Start: 2024-09-26 | End: 2024-09-26

## 2024-09-26 RX ORDER — SIMETHICONE 80 MG
1 TABLET,CHEWABLE ORAL 4 TIMES DAILY PRN
Status: DISCONTINUED | OUTPATIENT
Start: 2024-09-26 | End: 2024-09-26 | Stop reason: SDUPTHER

## 2024-09-26 RX ORDER — DIPHENOXYLATE HYDROCHLORIDE AND ATROPINE SULFATE 2.5; .025 MG/1; MG/1
2 TABLET ORAL EVERY 6 HOURS PRN
Status: DISCONTINUED | OUTPATIENT
Start: 2024-09-26 | End: 2024-09-28 | Stop reason: HOSPADM

## 2024-09-26 RX ORDER — SODIUM CHLORIDE 0.9 % (FLUSH) 0.9 %
10 SYRINGE (ML) INJECTION
Status: DISCONTINUED | OUTPATIENT
Start: 2024-09-26 | End: 2024-09-28 | Stop reason: HOSPADM

## 2024-09-26 RX ORDER — HYDROCORTISONE 25 MG/G
CREAM TOPICAL 3 TIMES DAILY PRN
Status: DISCONTINUED | OUTPATIENT
Start: 2024-09-26 | End: 2024-09-28 | Stop reason: HOSPADM

## 2024-09-26 RX ORDER — CALCIUM CARBONATE 200(500)MG
500 TABLET,CHEWABLE ORAL 3 TIMES DAILY PRN
Status: DISCONTINUED | OUTPATIENT
Start: 2024-09-26 | End: 2024-09-28 | Stop reason: HOSPADM

## 2024-09-26 RX ORDER — TRANEXAMIC ACID 10 MG/ML
1000 INJECTION, SOLUTION INTRAVENOUS EVERY 30 MIN PRN
Status: DISCONTINUED | OUTPATIENT
Start: 2024-09-26 | End: 2024-09-26

## 2024-09-26 RX ORDER — CARBOPROST TROMETHAMINE 250 UG/ML
250 INJECTION, SOLUTION INTRAMUSCULAR
Status: DISCONTINUED | OUTPATIENT
Start: 2024-09-26 | End: 2024-09-26

## 2024-09-26 RX ORDER — OXYTOCIN-SODIUM CHLORIDE 0.9% IV SOLN 30 UNIT/500ML 30-0.9/5 UT/ML-%
0-32 SOLUTION INTRAVENOUS CONTINUOUS
Status: DISCONTINUED | OUTPATIENT
Start: 2024-09-26 | End: 2024-09-28 | Stop reason: HOSPADM

## 2024-09-26 RX ORDER — DIPHENHYDRAMINE HCL 25 MG
25 CAPSULE ORAL EVERY 4 HOURS PRN
Status: DISCONTINUED | OUTPATIENT
Start: 2024-09-26 | End: 2024-09-28 | Stop reason: HOSPADM

## 2024-09-26 RX ORDER — ONDANSETRON 8 MG/1
8 TABLET, ORALLY DISINTEGRATING ORAL EVERY 8 HOURS PRN
Status: DISCONTINUED | OUTPATIENT
Start: 2024-09-26 | End: 2024-09-26

## 2024-09-26 RX ORDER — DIPHENOXYLATE HYDROCHLORIDE AND ATROPINE SULFATE 2.5; .025 MG/1; MG/1
2 TABLET ORAL EVERY 6 HOURS PRN
Status: DISCONTINUED | OUTPATIENT
Start: 2024-09-26 | End: 2024-09-26

## 2024-09-26 RX ORDER — ONDANSETRON 8 MG/1
8 TABLET, ORALLY DISINTEGRATING ORAL EVERY 8 HOURS PRN
Status: DISCONTINUED | OUTPATIENT
Start: 2024-09-26 | End: 2024-09-28 | Stop reason: HOSPADM

## 2024-09-26 RX ORDER — MISOPROSTOL 200 UG/1
800 TABLET ORAL ONCE AS NEEDED
Status: DISCONTINUED | OUTPATIENT
Start: 2024-09-26 | End: 2024-09-26

## 2024-09-26 RX ORDER — FENTANYL/BUPIVACAINE/NS/PF 2MCG/ML-.1
PLASTIC BAG, INJECTION (ML) INJECTION
Status: DISCONTINUED | OUTPATIENT
Start: 2024-09-26 | End: 2024-09-26

## 2024-09-26 RX ORDER — OXYTOCIN-SODIUM CHLORIDE 0.9% IV SOLN 30 UNIT/500ML 30-0.9/5 UT/ML-%
95 SOLUTION INTRAVENOUS ONCE AS NEEDED
Status: DISCONTINUED | OUTPATIENT
Start: 2024-09-26 | End: 2024-09-26

## 2024-09-26 RX ORDER — OXYTOCIN-SODIUM CHLORIDE 0.9% IV SOLN 30 UNIT/500ML 30-0.9/5 UT/ML-%
30 SOLUTION INTRAVENOUS ONCE AS NEEDED
Status: DISCONTINUED | OUTPATIENT
Start: 2024-09-26 | End: 2024-09-28 | Stop reason: HOSPADM

## 2024-09-26 RX ORDER — OXYTOCIN-SODIUM CHLORIDE 0.9% IV SOLN 30 UNIT/500ML 30-0.9/5 UT/ML-%
30 SOLUTION INTRAVENOUS ONCE AS NEEDED
Status: DISCONTINUED | OUTPATIENT
Start: 2024-09-26 | End: 2024-09-26

## 2024-09-26 RX ORDER — OXYTOCIN 10 [USP'U]/ML
10 INJECTION, SOLUTION INTRAMUSCULAR; INTRAVENOUS ONCE AS NEEDED
Status: DISCONTINUED | OUTPATIENT
Start: 2024-09-26 | End: 2024-09-28 | Stop reason: HOSPADM

## 2024-09-26 RX ORDER — OXYCODONE AND ACETAMINOPHEN 5; 325 MG/1; MG/1
1 TABLET ORAL EVERY 4 HOURS PRN
Status: ACTIVE | OUTPATIENT
Start: 2024-09-26 | End: 2024-09-27

## 2024-09-26 RX ORDER — METHYLERGONOVINE MALEATE 0.2 MG/ML
200 INJECTION INTRAVENOUS ONCE AS NEEDED
Status: DISCONTINUED | OUTPATIENT
Start: 2024-09-26 | End: 2024-09-26

## 2024-09-26 RX ADMIN — ACETAMINOPHEN 650 MG: 325 TABLET ORAL at 11:09

## 2024-09-26 RX ADMIN — SODIUM CHLORIDE, POTASSIUM CHLORIDE, SODIUM LACTATE AND CALCIUM CHLORIDE: 600; 310; 30; 20 INJECTION, SOLUTION INTRAVENOUS at 10:09

## 2024-09-26 RX ADMIN — Medication 2 ML: at 11:09

## 2024-09-26 RX ADMIN — OXYTOCIN-SODIUM CHLORIDE 0.9% IV SOLN 30 UNIT/500ML 10 UNITS: 30-0.9/5 SOLUTION at 03:09

## 2024-09-26 RX ADMIN — Medication 95 MILLI-UNITS/MIN: at 04:09

## 2024-09-26 RX ADMIN — IBUPROFEN 600 MG: 600 TABLET ORAL at 05:09

## 2024-09-26 RX ADMIN — Medication 2 MILLI-UNITS/MIN: at 09:09

## 2024-09-26 RX ADMIN — SODIUM CHLORIDE, POTASSIUM CHLORIDE, SODIUM LACTATE AND CALCIUM CHLORIDE: 600; 310; 30; 20 INJECTION, SOLUTION INTRAVENOUS at 09:09

## 2024-09-26 RX ADMIN — ACETAMINOPHEN 650 MG: 325 TABLET ORAL at 05:09

## 2024-09-26 RX ADMIN — SODIUM CHLORIDE, POTASSIUM CHLORIDE, SODIUM LACTATE AND CALCIUM CHLORIDE: 600; 310; 30; 20 INJECTION, SOLUTION INTRAVENOUS at 12:09

## 2024-09-26 RX ADMIN — IBUPROFEN 600 MG: 600 TABLET ORAL at 11:09

## 2024-09-26 RX ADMIN — Medication 10 ML/HR: at 11:09

## 2024-09-26 NOTE — LACTATION NOTE
09/26/24 1610   Maternal Assessment   Breast Density Bilateral:;soft   Areola Bilateral:;dense   Nipples Bilateral:;everted   Maternal Infant Feeding   Maternal Emotional State assist needed   Infant Positioning clutch/football   Signs of Milk Transfer infant jaw motion present   Pain with Feeding no   Comfort Measures Before/During Feeding infant position adjusted;latch adjusted   Latch Assistance yes     Infant skin to skin with patient. Assisted with latching on the right side in laid back/cradle position. Infant sleepy- placed nipple to top lip- refused to open mouth to latch. Used gloved finger in infant's mouth to stimulate sucking reflex. Suck verified. Attempted to latch again. Infant refusing to open mouth. Repositioned to left breast in football position. After a few attempts and with breast support infant latched on and able to obtain effective latch. On and off sucking verified. Performed breast compressions and gently rubbed infant's hand to keep him actively sucking. Signs of an effective latch and hunger/satiety cues reviewed. Encouraged to feed infant on cue at least 8 in 24. Basic breastfeeding information reviewed. Patient verbalized understanding. All questions answered. Told to call for further lactation support needs.    Giovanny Edmonds  #[id number] used for education.

## 2024-09-26 NOTE — ANESTHESIA PREPROCEDURE EVALUATION
09/26/2024  Moraima Gao is a 29 y.o., female.      Pre-op Assessment    I have reviewed the Patient Summary Reports.     I have reviewed the Nursing Notes.    I have reviewed the Medications.     Review of Systems  Anesthesia Hx:  No problems with previous Anesthesia   Neg history of prior surgery.          Denies Family Hx of Anesthesia complications.    Denies Personal Hx of Anesthesia complications.                    Social:  Non-Smoker, No Alcohol Use       Hematology/Oncology:  Hematology Normal   Oncology Normal                                   EENT/Dental:  EENT/Dental Normal           Cardiovascular:  Cardiovascular Normal Exercise tolerance: good                                           Pulmonary:  Pulmonary Normal                       Renal/:  Renal/ Normal                 Hepatic/GI:  Hepatic/GI Normal                 Musculoskeletal:  Musculoskeletal Normal                Neurological:  Neurology Normal                                      Endocrine:  Endocrine Normal            Dermatological:  Skin Normal    Psych:  Psychiatric Normal                    Physical Exam  General: Well nourished    Airway:  Mallampati: II   Mouth Opening: Normal  Tongue: Normal  Neck ROM: Normal ROM    Dental:  Intact    Chest/Lungs:  Clear to auscultation    Heart:  Rate: Normal  Rhythm: Regular Rhythm  Sounds: Normal      Lab Results   Component Value Date    WBC 5.79 09/26/2024    HGB 12.4 09/26/2024    HCT 37.3 09/26/2024    MCV 80 (L) 09/26/2024     09/26/2024         Chemistry        Component Value Date/Time     (L) 09/26/2024 0850    K 3.5 09/26/2024 0850     09/26/2024 0850    CO2 19 (L) 09/26/2024 0850    BUN 8 09/26/2024 0850    CREATININE 0.7 09/26/2024 0850     (H) 09/26/2024 0850        Component Value Date/Time    CALCIUM 9.5 09/26/2024 0850    ALKPHOS 270 (H)  INDICATION: Survey.



TECHNIQUE: Multiple real-time grayscale images were obtained over

the gravid uterus.



COMPARISON: None.



FINDINGS: Corado gestation is in cephalic position. The

placenta is anterior with no abruption or previa. Fetal spine and

cardiac structures are incompletely visualized on positional and

technical basis. The remaining osseous structures are

unremarkable. Biometrical measurements correlate with an age 21

weeks 5 days with sonographic date of confinement of 06/15/2019.



IMPRESSION: 21 weeks 5 days cephalic-positioned corado viable

IUP. Anatomical survey limited at the level of the heart and

spine.



Biometrical measurements are as follows:

Biparietal 5.05 cm, age 21 weeks 3 days.

Head circumference 19.14 cm, age 21 weeks 3 days.

Abdominal circumference 16.86 cm, age 22 weeks 0 days.

Femur length 3.70 cm, age 21 weeks 6 days.



Sonographic estimate age: 21 weeks 5 days.

Sonographic estimated date of delivery: 06/15/2019.



Estimated Fetal Weight: 449 gm (+/- 66  gm).

LMP percentile: 98%.



Fetal heart rate: 138 beats per minute.



Fetal number: 1 of 1.



Dictated by: 



  Dictated on workstation # [id number] 09/26/2024 0850    AST 12 09/26/2024 0850    ALT 10 09/26/2024 0850    BILITOT 0.4 09/26/2024 0850            Anesthesia Plan  Type of Anesthesia, risks & benefits discussed:    Anesthesia Type: CSE  Intra-op Monitoring Plan: Standard ASA Monitors  Induction:  IV  Informed Consent: Informed consent signed with the Patient and all parties understand the risks and agree with anesthesia plan.  All questions answered. Patient consented to blood products? Yes  ASA Score: 2    Ready For Surgery From Anesthesia Perspective.     .

## 2024-09-26 NOTE — HOSPITAL COURSE
2024:  complicated by nuchal and body cord, 10lb 7 oz  Postpartum  day # 1.  Doing well.  breast and bottle Feeding.  Pain well controlled.  minimal lochia.  Ambulating and voiding without difficulty.  Tolerating a regular diet without nausea or vomiting.

## 2024-09-26 NOTE — L&D DELIVERY NOTE
"[facility] [place] - Labor & Delivery  Vaginal Delivery   Operative Note    SUMMARY     Normal spontaneous vaginal delivery of live infant, skin to skin was unable to be performed due to need for assessment by kareem .  Infant delivered position KACIE over intact perineum.  Nuchal cord:  nuchal and body cord reduced following delivery .    Spontaneous delivery of placenta and IV pitocin given noting good uterine tone.  Shallow left vaginal wall laceration repaired with 3-0 vicryl .  Patient tolerated delivery well. Sponge needle and lap counted correctly x2.    Indications: Term pregnancy  Pregnancy complicated by:   Patient Active Problem List   Diagnosis    Term pregnancy     (spontaneous vaginal delivery)     Admitting GA: 40w4d    Delivery Information for Constantin Gao    Birth information:  YOB: 2023   Time of birth: 3:27 PM   Sex: male   Head Delivery Date/Time: 2024  3:27 PM   Delivery type: Vaginal, Spontaneous   Gestational Age: 40w4d        Delivery Providers    Delivering clinician: Aaron Devi III, MD   Provider Role    Roxanne Adrian RN Delivery Nurse    Edith Shukla Scrub Person    Joanne Amor RN Registered Nurse    Marly Gale RN NICU Barrois, Aimee B., NNP Nurse Practitioner    Marcelle Kendrick, RRT Respiratory Therapist              Measurements    Weight: 4730 g  Weight (lbs): 10 lb 6.8 oz  Length: 57.8 cm  Length (in): 22.75"         Apgars    Living status: Living  Apgar Component Scores:  1 min.:  5 min.:  10 min.:  15 min.:  20 min.:    Skin color:  1  1       Heart rate:  2  2       Reflex irritability:  2  2       Muscle tone:  1  2       Respiratory effort:  1  2       Total:  7  9       Apgars assigned by: ANMOL VASQUEZ         Operative Delivery    Forceps attempted?: No  Vacuum extractor attempted?: No         Shoulder Dystocia    Shoulder dystocia present?: No           Presentation    Presentation: Vertex  Position: Right Occiput " Anterior           Interventions/Resuscitation    Method: Bulb Suctioning, Tactile Stimulation, NICU Attended       Cord    Vessels: 3 vessels  Complications: Nuchal  Nuchal Intervention: reduced  Nuchal Cord Description: loose nuchal cord  Number of Loops: 1  Delayed Cord Clamping?: Yes  Cord Clamped Date/Time: 2024  3:58 PM  Cord Blood Disposition: Sent with Baby  Gases Sent?: No  Stem Cell Collection (by MD): No       Placenta    Placenta delivery date/time: 2024 1532  Placenta removal: Spontaneous  Placenta appearance: Intact  Placenta disposition: Discarded           Labor Events:       labor: No     Labor Onset Date/Time: 2024 12:20     Dilation Complete Date/Time: 2024 15:03     Start Pushing Date/Time: 2024 15:15       Start Pushing Date/Time: 2024 15:15     Rupture Date/Time: 24  1220         Rupture type: ARM (Artificial Rupture)         Fluid Amount:       Fluid Color: Clear               steroids: None     Antibiotics given for GBS: No     Induction: none     Indications for induction:        Augmentation: oxytocin;amniotomy     Indications for augmentation: Ineffective Contraction Pattern     Labor complications: None     Additional complications:          Cervical ripening:                     Delivery:      Episiotomy: None     Indication for Episiotomy:       Perineal Lacerations: None Repaired:      Periurethral Laceration:   Repaired:     Labial Laceration:   Repaired:     Sulcus Laceration:   Repaired:     Vaginal Laceration: Yes Repaired: Yes   Cervical Laceration:   Repaired:     Repair suture:       Repair # of packets: 1     Last Value - EBL - Nursing (mL):       Sum - EBL - Nursing (mL): 0     Last Value - EBL - Anesthesia (mL):      Calculated QBL (mL): 319      Running total QBL (mL): 319      Vaginal Sweep Performed: Yes     Surgicount Correct: Yes     Vaginal Packing: No Quantity:       Other providers:       Anesthesia    Method:  Epidural          Details (if applicable):  Trial of Labor      Categorization:      Priority:     Indications for :     Incision Type:       Additional  information:  Forceps:    Vacuum:    Breech:    Observed anomalies    Other (Comments):

## 2024-09-26 NOTE — NURSING
Patient presents with complaints of being in labor. Patient states contractions started around 5:30 this morning and are about 2 to 3 mins apart. States she had some bleeding at 7:30 this morning but it has gotten a lot less and is a light pink.   No leaking of fluid. Positive fetal movement.     SVE 4/70/-3.     .

## 2024-09-26 NOTE — H&P
[facility] - Labor & Delivery  Obstetrics  History & Physical    Patient Name: Moraima Gao  MRN: [id number]  Admission Date: 2024  Primary Care Provider: Marifer, Primary Doctor    Subjective:     Principal Problem:Term pregnancy    History of Present Illness: 28 yo  at 40w4d presents with contractions, found to be 4cm dilated     Obstetric HPI:  Patient reports regular contractions, active fetal movement, minimal vaginal bleeding , No loss of fluid     OB History    Para Term  AB Living   3 1 1 0 1 1   SAB IAB Ectopic Multiple Live Births   1 0 0 0 1      # Outcome Date GA Lbr Kaushal/2nd Weight Sex Type Anes PTL Lv   3 Current            2 Term 19   3.58 kg (7 lb 14.3 oz) M Vag-Spont      1 SAB      SAB   FD     No past medical history on file.  No past surgical history on file.    PTA Medications   Medication Sig    acetaminophen (TYLENOL) 500 MG tablet Take 1 tablet (500 mg total) by mouth every 6 (six) hours as needed for Pain.    famotidine (PEPCID) 20 MG tablet Take 1 tablet (20 mg total) by mouth once daily.    prenatal 21-iron fu-folic acid (PRENATAL COMPLETE) 14 mg iron- 400 mcg Tab Take 1 tablet by mouth once daily.    promethazine (PHENERGAN) 25 MG tablet Take 25 mg by mouth every 6 (six) hours as needed.       Review of patient's allergies indicates:  No Known Allergies     Family History    None       Tobacco Use    Smoking status: Never    Smokeless tobacco: Never   Substance and Sexual Activity    Alcohol use: Not Currently    Drug use: Never    Sexual activity: Yes     Partners: Male     Review of Systems   Gastrointestinal:  Positive for abdominal pain.   All other systems reviewed and are negative.     Objective:     Vital Signs (Most Recent):    Vital Signs (24h Range):           There is no height or weight on file to calculate BMI.    FHT: 134-140 Cat 1 (reassuring)  TOCO:  Q 10 minutes    Physical Exam:   Constitutional: She is oriented to person, place, and time. She  appears well-developed and well-nourished.    HENT:   Head: Normocephalic and atraumatic.    Eyes: Conjunctivae and EOM are normal.      Pulmonary/Chest: Effort normal.        Abdominal: Soft.   gravid             Musculoskeletal: Normal range of motion.       Neurological: She is alert and oriented to person, place, and time.    Skin: Skin is warm and dry.    Psychiatric: She has a normal mood and affect.       Cervix:  Dilation:  4  Effacement:  70  Station: -3  Presentation: Vertex     Significant Labs:  Lab Results   Component Value Date    [facility] B POS 2024       I have personallly reviewed all pertinent lab results from the last 24 hours.  Recent Lab Results       None            Assessment/Plan:     29 y.o. female  at 40w4d for:    Active Diagnoses:    Diagnosis Date Noted POA    PRINCIPAL PROBLEM:  Term pregnancy [Z34.90] 2024 Not Applicable      Problems Resolved During this Admission:     - admit for management of latent labor at term  - augment with pitocin if no cervical change or contractions remain irregular  - epidural at pt request  - GBS neg, Rh+  - recheck in 2-4 hours or PRN    Aaron Devi III, MD  Obstetrics  [facility] - Labor & Delivery

## 2024-09-26 NOTE — NURSING
Dr. Devi notified at this time of patient's arrival to triage and SVE.     Orders to admit for labor. Has sign and held orders in the computer.

## 2024-09-26 NOTE — LETTER
September 27, 2024         Stephanie COWART  [facility]  [place] [address]  Phone: [phone]  Fax: [phone]       Patient: Moraima Gao   YOB: 1995  Date of Visit: 09/27/2024    To Whom It May Concern:    Rima Gao  was at [facility] on 9/26/24. She was admitted to the hospital.   Sincerely,    Toshia Parsons RN

## 2024-09-26 NOTE — DISCHARGE INSTRUCTIONS
"Instrucciones de charlie de lactancia     La [facility] de Pediatría recomienda la lactancia materna exclusiva clif los primeros 6 meses de evert y la continuación de la lactancia con la introducción de alimentos complementarios más allá del primer año de evert. La [employer] de la [place] y la [facility] de Pediatría recomiendan retrasar el uso del biberón y el chupete hasta después de las 4 semanas de edad y la lactancia esté arsalan establecida. La [facility] de Pediatría recomienda el uso de un chupete a la hora de la siesta y la hora de acostarse, herminio hue estrategia de reducción de SMSL, para recién nacidos amamantados solo después de 1 mes de edad y la lactancia materna se ha establecido firmemente.   Alimente al bebé a la primera señal de hambre o comodidad  o Blade a la boca, movimientos de succión  o Buscando o buscando algo para chupar  o No espere a llorar - no es hue señal tardía de hambre; es señal de angustia   Las alimentaciones pueden ser de 8 a 12 veces por 24 horas y no seguirán un horario   Alterne el seno con el que comienza la alimentación o comience con el seno que se siente más lleno   Cambie de seno cuando el bebé se jorden del seno o se queda dormido   Continúe ofreciéndole los senos hasta que el bebé se pierce lleno, ya no dé señales de hambre y permanezca dormido cuando lo coloque boca arriba en la cuna.   Si el bebé tiene sueño y no se despierta para alimentarlo, colóquelo piel con piel vestido con un pañal contra el pecho desnudo de la madre.   Duerma cerca de rose bebé   El bebé debe colocarse y agarrarse al pecho correctamente  o Pecho contra pecho, mentón en el pecho  o Los labios del bebé están "volteados" hacia afuera  o La boca del bebé está completamente abierta herminio un grito  o La succión del bebé debe sentirse herminio si estuviera tirando de la madre.  ? El bebé debe beber del pecho:  o Debería oír tragar o tragar saliva clif la " alimentación.  o Debería kemar leche en los labios del bebé cuando se jorden el pecho  o Stacy senos deben estar más suaves cuando el bebé termine de mamar  o El bebé debe verse relajado al final de las troy.  o Después del 4to día y rose leche está en:  o La moses del bebé debe volverse de color amarillo brillante y estar suelta, acuosa y con semillas  o El bebé debe tener al menos 3-4 cacas del tamaño de la finch de rose mano por día  o El bebé debe tener al menos 6-8 pañales mojados por día  o La orina debe ser de color amarillo ritchie  Debe beber cuando tenga sed y comer hue dieta saludable cuando tenga  hambriento.  Betsey siestas para obtener el descanso que necesitas.  [place] medicamentos y/o roshni alcohol solo con el permiso de rose obstetra  o el pediatra del bebé. También puede llamar al [facility] [place] [facility],  ([phone]), de lunes a viernes, de 8 a. m. a 5 p. m., [facility], para aprovechar al sravan  información actualizada basada en evidencia sobre el uso de medicamentos clif  embarazo y lactancia.    El bebé debe ser examinado por un pediatra a los 3-5 días de nacido; a menos que lo ordene antes el pediatra.  Hue vez que le baje la leche, el bebé debe volver al peso de nacimiento a más tardar entre los 10 y 14 días de nacido.    Recursos para la lactancia:    Bebé Café: [phone]    Liga de La Leche: [phone]    [facility] de Lactancia Materna de [facility] Baby Café: [email or web address]      Ingurgitación primaria:    Si la leche fluye, aplique calor húmedo o seco en los senos clif aproximadamente 10 minutos antes de cada betsey herminio medida de comodidad para facilitar el reflejo de eyección de la leche.    Siga el tratamiento térmico con un masaje de senos para suavizar las áreas duras o con bultos del seno.    Use alimentaciones sin restricciones, frecuentes y efectivas    Despierta al bebé para alimentarlo si es necesario.    Evite la alimentación  con chupete y biberón    Extracción manual o bomba de senos hasta el punto de comodidad según sea necesario    Use tratamientos fríos en forma de bolsas de hielo/bolsas de gel/verduras congeladas envueltas en un paño suave y boone y aplíquelas en los senos clif aproximadamente 20 minutos después de cada alimentación hasta que se resuelva la congestión.    Use un sostén cómodo y de apoyo.    [place] analgésicos según sea necesario    Use medicamentos antiinflamatorios si los prescribe el médico.Recursos comunitarios para madres en período de lactancia  Centros de lactancia/consultores de lactancia del hospital [facility].......................................................................................................................................................[phone]  [facility]..............................................................................................................................................[phone]  [facility]....................................................................................................................................................... [phone]  [facility].......................................................................................................................[phone]    AAPCC (Control de intoxicaciones) ............................................[phone]  PoisonHelp.org  Asesoramiento médico gratuito 24 horas al día, 7 días a la semana, a través de la Línea de Ayuda contrala Intoxicación y la herramienta en línea.  Recursos en línea  [facility] ......................................................................................................ibconline.ca  El recurso en línea del Dr. Quang Castro ofrece videos, artículos y hojas informativas.  Coffective  .............................................................................................................................................................................coffective.com  Descargue la aplicación móvil gratuita para ayudarle a empezar con la lactancia materna de la mejor manera posible.  Droplet.........................................................................................................................................................................firstdroplets.com  La lactancia materna requiere la máxima atención clif los primeros brett días después del nacimiento. Droplet anima a los padres a aprovechar esta ventana crítica con técnicas de lactancia eficaces para prevenir los problemas más frecuentes.  [employer]...................................................................................................................................globalhealthmedia.org  Videos que enseñan y empoderan a madres y acompañantes.  [facility]...........................................................................................................................[phone]  [facility].com  Ofrece información actualizada sobre el uso de medicamentos por parte de las madres clif el embarazo y la lactancia.  Marialuisa Allison............................................................................................................................................................................................ronda.com  Ofrece información en línea sobre la lactancia y la crianza de los hijos.  La Leche League..................................................................................................................................................lllalmsla.org  li.org  Grupos de apoyo entre madres con educación, apoyo informativo y ánimo para las mujeres que ariadna amamantar.  Work and  Pump..................................................................................................................................................... workandpump.com  Información sobre lactancia materna para madres trabajadoras.  Recursos de [facility] Breastfeeding [facility]..............................[phone] ([phone])  [facility].org  Encuentre apoyo local para la lactancia, incluidos asesores de lactancia, clínicas de Mujeres, Bebés y Niños  (WIC, por chan siglas en inglés), grupos de apoyo, entre otros.  [place] Breastfeeding Support.....................................................................................LaBreastfeedingSup[facility].org  Búsqueda por código postal de recursos para la lactancia en rose bill.  Partners for Healthy Babies...........................................................................1-800-251-BABY ([phone])  9488076grfs.org  Conecta a las madres de [place] y chan familias con los recursos de marbiel y embarazo. Disponibles las 24 horas del día, los 7 días de la semana.  [facility]........................................................................................................................................................[phone]  ldh.la.gov/[facility]  Un programa de nutrición en todo el estado para mujeres embarazadas, en periodo de lactancia y de posparto, bebés y niños (menores de 5 años). Ofrece información sobre alimentos y nutrición. También ofrece apoyo a la lactancia por parte de consejeros pares.  Recursos de la bill de [place]  [facility] .........................................................................................................................................[facility].org  Los centros comunitarios están convenientemente ubicados en [place], Yusuf Das [place], [place], [place], [place], [place], Daniela Vaz y Jennifer. Se ofrecen servicios pediátricos, servicios de maribel de la kai, servicios  de WIC y más.  Baby Café ......................................................................................................................................................................babycafeusa.org  Grupos de apoyo a la lactancia gratuitos, informales y sin bryce previa que ofrecen atención médica e intervención profesional en lactancia.  Birthmark [facility]as/[facility]...................................................... birthmarkdoulas.com  Clínicas de alimentación infantil con atención espontánea, servicios de lactancia, grupos de apoyo y programas educativos.  Cafe au Lait.................................................................[phone]  [employer].[email or web address]/groups/[email or web address]  Gio gratuito de apoyo a la lactancia materna para familias de color.  Healthy Start [place]..................................................[phone] ([place])  [phone] (Sam)  Atiende a las mujeres en edad fértil y aborda los problemas bárbara.[email or web address]-[email or web address]-start de las mujeres embarazadas y chan hijos desde el nacimiento hasta los dos años.  La Leche [place] [facility] [place].........................................[employer].[email or web address].TouristEye/TripOvation  Grupos de apoyo presenciales y virtuales entre madres con educación, apoyo informativo y ánimo para las mujeres que ariadna amamantar.  [facility]....................................[phone] ([phone])  ...................................................................................................[email or web address]/services/[email or web address]-milk-bank[email or web address]  A pesar de intentarlo, a veces las madres no pueden producir rose propia leche. Si alguna vez no puede producir la leche suficiente, la leche de donantes es la mejor opción. También se aceptan donantes.  BÁRBARA  Nesting.....................................................................................................................[phone]  nolanesting.com  Apoyo presencial y virtual a las familias clif el embarazo, el parto y los primeros años de maternidad.      After a Vaginal Birth    General Discharge Instructions    May follow a regular diet, unless otherwise discussed with physician.  Take showers, not baths unless otherwise discussed with physician.  Activity as tolerated.  No lifting or heavy exercise for 6 weeks, no driving for 2 weeks, no sexual intercourse, douching or tampons for 6 weeks  May return to work/school as discussed with physician  Take medications as directed  Discuss birth control with physician  Breast care support bra worn at all times  Lactation consultant referral number ( [phone] or [phone])      Call Your Healthcare Provider Right Away If You Have:  A temperature of 100.4°F or higher.  If your blood pressure is over 140/90.  You have difficulty catching your breath or trouble breathing.  Heavy vaginal bleeding, clots, or vaginal discharge with foul odor. (heavier than menses)  Persistent nausea or vomiting.  You gain more than 3 pounds in 3 days.  Severe headaches not relieved by Tylenol (acetaminophen) or Motrin (ibuprofen)  Blurry or double vision, see spots or flashing lights.  Dizziness or fainting.  New onset swelling or worsening of existing swelling.  Burning or pain when you urinate.  No bowel movement for 5 days.  Redness, warmth, swelling, or pain in the lower leg.  Redness, discharge, or pain worse than you had in the hospital.  Burning, pain, red streaks, or lumpy areas in your breasts.  Cracks, blisters, or blood on your nipples.  Feelings of extreme sadness or anxiety, or a feeling that you dont want to be with your baby.  If you have any new or unusual symptoms or have questions or concerns    Some of these symptoms can occur up to 4 to 6 weeks after  delivery. This can be a sign of pre-eclampsia, which is a serious disease that can cause stroke, seizures, organ damage, or death. Do not wait to call your doctor or seek medical attention.    If You Had Stitches  You may have received stitches in the skin near your vagina. The stitches might have closed an episiotomy (an incision that enlarges the opening of the vagina). Or you may have needed stitches to repair torn skin. Either way, your stitches should dissolve within weeks. Until then, you can help reduce discomfort, aid healing, and reduce your risk of infection by keeping the stitches clean. These tips can help:    Gently wipe from front to back after you urinate or have a bowel movement.  After wiping, spray warm water on the area. Or you can have a sitz bath. This means sitting in a tub with a few inches of water in it. Then pat the area dry or use a hairdryer on a cool setting.  You can take a shower instead of a bath.  Change sanitary pads at least every 2-4 hours.  Place cold or heat packs on the area as directed by your doctors or nurses. Keep a thin towel between the pack and your skin.  Sit on firm seats so the stitches pull less.     Follow-Up  Schedule a  follow-up exam with your healthcare provider for about 6 weeks after delivery. During this exam, your uterus and vaginal area will be checked. Contact your healthcare provider if you think you or your baby are having any problems.

## 2024-09-26 NOTE — ANESTHESIA PROCEDURE NOTES
CSE    Patient location during procedure: OB  Start time: 9/26/2024 11:18 AM  Timeout: 9/26/2024 11:17 AM  End time: 9/26/2024 11:24 AM      Staffing  Authorizing Provider: Renetta Linton MD  Performing Provider: Renetta Linton MD    Staffing  Performed by: Renetta Linton MD  Authorized by: Renetta Linton MD    Preanesthetic Checklist  Completed: patient identified, IV checked, risks and benefits discussed, monitors and equipment checked, pre-op evaluation and timeout performed  CSE  Patient position: sitting  Prep: ChloraPrep  Patient monitoring: heart rate, continuous pulse ox and frequent blood pressure checks  Approach: midline  Spinal Needle  Needle type: Emy   Needle gauge: 25 G  Needle length: 5 in  Epidural Needle  Injection technique: АЛЕКСАНДР saline  Needle type: Tuohy   Needle gauge: 17 G  Needle length: 3.5 in  Needle insertion depth: 7 cm  Location: L4-5   Catheter  Catheter type: end hole  Catheter size: 19 G  Catheter at skin depth: 12 cm  Test dose: lidocaine 1.5% with Epi 1-to-200,000 and negative  Test dose: 5 mL  Additional Documentation: incremental injection, negative aspiration for CSF, negative aspiration for heme, no paresthesia on injection and negative test dose  Assessment  Sensory level: T10   Dermatomal levels determined by pinch or prick  Intrathecal Medications:   administered: primary anesthetic mcg of

## 2024-09-27 LAB
BASOPHILS # BLD AUTO: 0.01 K/UL (ref 0–0.2)
BASOPHILS NFR BLD: 0.1 % (ref 0–1.9)
DIFFERENTIAL METHOD BLD: ABNORMAL
EOSINOPHIL # BLD AUTO: 0.1 K/UL (ref 0–0.5)
EOSINOPHIL NFR BLD: 0.9 % (ref 0–8)
ERYTHROCYTE [DISTWIDTH] IN BLOOD BY AUTOMATED COUNT: 14.4 % (ref 11.5–14.5)
HCT VFR BLD AUTO: 34.8 % (ref 37–48.5)
HGB BLD-MCNC: 11.2 G/DL (ref 12–16)
IMM GRANULOCYTES # BLD AUTO: 0.04 K/UL (ref 0–0.04)
IMM GRANULOCYTES NFR BLD AUTO: 0.5 % (ref 0–0.5)
LYMPHOCYTES # BLD AUTO: 2.1 K/UL (ref 1–4.8)
LYMPHOCYTES NFR BLD: 25.6 % (ref 18–48)
MCH RBC QN AUTO: 26.4 PG (ref 27–31)
MCHC RBC AUTO-ENTMCNC: 32.2 G/DL (ref 32–36)
MCV RBC AUTO: 82 FL (ref 82–98)
MONOCYTES # BLD AUTO: 1 K/UL (ref 0.3–1)
MONOCYTES NFR BLD: 12.8 % (ref 4–15)
NEUTROPHILS # BLD AUTO: 4.8 K/UL (ref 1.8–7.7)
NEUTROPHILS NFR BLD: 60.1 % (ref 38–73)
NRBC BLD-RTO: 0 /100 WBC
PLATELET # BLD AUTO: 173 K/UL (ref 150–450)
PMV BLD AUTO: 10.6 FL (ref 9.2–12.9)
RBC # BLD AUTO: 4.24 M/UL (ref 4–5.4)
RUBV IGG SER-ACNC: 113 IU/ML
RUBV IGG SER-IMP: REACTIVE
WBC # BLD AUTO: 8.04 K/UL (ref 3.9–12.7)

## 2024-09-27 PROCEDURE — 11000001 HC ACUTE MED/SURG PRIVATE ROOM

## 2024-09-27 PROCEDURE — 85025 COMPLETE CBC W/AUTO DIFF WBC: CPT | Performed by: STUDENT IN AN ORGANIZED HEALTH CARE EDUCATION/TRAINING PROGRAM

## 2024-09-27 PROCEDURE — 36415 COLL VENOUS BLD VENIPUNCTURE: CPT | Performed by: STUDENT IN AN ORGANIZED HEALTH CARE EDUCATION/TRAINING PROGRAM

## 2024-09-27 PROCEDURE — 25000003 PHARM REV CODE 250: Performed by: STUDENT IN AN ORGANIZED HEALTH CARE EDUCATION/TRAINING PROGRAM

## 2024-09-27 PROCEDURE — 99231 SBSQ HOSP IP/OBS SF/LOW 25: CPT | Mod: ,,, | Performed by: OBSTETRICS & GYNECOLOGY

## 2024-09-27 RX ADMIN — IBUPROFEN 600 MG: 600 TABLET ORAL at 11:09

## 2024-09-27 RX ADMIN — OXYCODONE HYDROCHLORIDE AND ACETAMINOPHEN 1 TABLET: 10; 325 TABLET ORAL at 12:09

## 2024-09-27 RX ADMIN — IBUPROFEN 600 MG: 600 TABLET ORAL at 05:09

## 2024-09-27 RX ADMIN — ACETAMINOPHEN 650 MG: 325 TABLET ORAL at 12:09

## 2024-09-27 RX ADMIN — ACETAMINOPHEN 650 MG: 325 TABLET ORAL at 05:09

## 2024-09-27 RX ADMIN — PRENATAL VITAMINS-IRON FUMARATE 27 MG IRON-FOLIC ACID 0.8 MG TABLET 1 TABLET: at 09:09

## 2024-09-27 RX ADMIN — ACETAMINOPHEN 650 MG: 325 TABLET ORAL at 11:09

## 2024-09-27 RX ADMIN — IBUPROFEN 600 MG: 600 TABLET ORAL at 12:09

## 2024-09-27 NOTE — SUBJECTIVE & OBJECTIVE
she is doing well this morning. She is tolerating a regular diet without nausea or vomiting. She is voiding spontaneously. She is ambulating. She has passed flatus, and has not a BM. Vaginal bleeding is mild. She denies fever or chills. Abdominal pain is mild and controlled with oral medications. She Is breastfeeding. She desires circumcision for her male baby: yes.    Objective:     Vital Signs (Most Recent):  Temp: 98.4 °F (36.9 °C) (09/27/24 1234)  Pulse: 90 (09/27/24 1234)  Resp: 18 (09/27/24 1241)  BP: 122/72 (09/27/24 1234)  SpO2: 99 % (09/27/24 1234) Vital Signs (24h Range):  Temp:  [98 °F (36.7 °C)-98.6 °F (37 °C)] 98.4 °F (36.9 °C)  Pulse:  [] 90  Resp:  [16-20] 18  SpO2:  [86 %-100 %] 99 %  BP: (119-151)/(61-94) 122/72     Weight: 123.5 kg (272 lb 4.3 oz)  Body mass index is 48.23 kg/m².      Intake/Output Summary (Last 24 hours) at 9/27/2024 1324  Last data filed at 9/27/2024 0430  Gross per 24 hour   Intake 470.82 ml   Output 3619 ml   Net -3148.18 ml         Significant Labs:  Lab Results   Component Value Date    GROUPTRH B POS 09/26/2024    HEPBSAG Non-reactive 09/26/2024     Recent Labs   Lab 09/27/24  0601   HGB 11.2*   HCT 34.8*       I have personallly reviewed all pertinent lab results from the last 24 hours.    Physical Exam:   Constitutional: She is oriented to person, place, and time. She appears well-developed. No distress.    HENT:   Head: Normocephalic and atraumatic.    Eyes: EOM are normal.     Cardiovascular:  Normal rate.             Pulmonary/Chest: Effort normal.        Abdominal: Soft. She exhibits no distension and no mass (fundus firm and below the umbilicus). There is no abdominal tenderness.             Musculoskeletal: Normal range of motion.       Neurological: She is oriented to person, place, and time.    Skin: Skin is warm. No rash noted.    Psychiatric: She has a normal mood and affect.       Review of Systems   Constitutional: Negative.    HENT: Negative.     Eyes:  Negative.    Respiratory: Negative.     Cardiovascular: Negative.    Gastrointestinal: Negative.    Endocrine: Negative.    Genitourinary: Negative.    Musculoskeletal: Negative.    Integumentary:  Negative.   Neurological: Negative.    Hematological: Negative.    Psychiatric/Behavioral: Negative.     Breast: negative.

## 2024-09-27 NOTE — LACTATION NOTE
09/27/24 1240   Maternal Assessment   Breast Density Bilateral:;soft   Areola Bilateral:;elastic   Nipples Bilateral:;everted   Maternal Infant Feeding   Maternal Emotional State assist needed   Infant Positioning clutch/football   Signs of Milk Transfer audible swallow;infant jaw motion present   Pain with Feeding no   Comfort Measures Before/During Feeding infant position adjusted;latch adjusted;maternal position adjusted   Latch Assistance yes     Assisted to latch baby to left breast in football position. Baby latched deeply, nursing well with audible swallows. Mother denies pain during feeding. Reviewed basic breastfeeding instructions via AN interpretorKimmie (#[id number]). Discussed adequacy of colostrum and importance of breastfeeding at least eight times in 24 hours to stimulate and maintain adequate milk production. Discussed importance of putting baby to breast for every feeding prior to offering formula. Encouraged to call me for any further breastfeeding assistance. Patient verbalizes understanding of all instructions with good recall.

## 2024-09-27 NOTE — ANESTHESIA POSTPROCEDURE EVALUATION
Anesthesia Post Evaluation    Patient: Moraima Gao    Procedure(s) Performed: * No procedures listed *    Final Anesthesia Type: CSE      Patient location during evaluation: GI PACU  Patient participation: Yes- Able to Participate  Level of consciousness: awake and alert, oriented and awake  Post-procedure vital signs: reviewed and stable  Airway patency: patent    PONV status at discharge: No PONV  Anesthetic complications: no      Cardiovascular status: blood pressure returned to baseline  Respiratory status: unassisted, spontaneous ventilation and room air  Hydration status: euvolemic  Follow-up not needed.              Vitals Value Taken Time   /70 09/27/24 0425   Temp 36.9 °C (98.4 °F) 09/27/24 0425   Pulse 75 09/27/24 0425   Resp 16 09/27/24 0425   SpO2 96 % 09/27/24 0425         No case tracking events are documented in the log.      Pain/Emerson Score: Pain Rating Prior to Med Admin: 5 (9/27/2024  5:31 AM)  Pain Rating Post Med Admin: 3 (9/27/2024  6:14 AM)

## 2024-09-27 NOTE — NURSING
Informed pt of quiet time from 2pm-4pm.  Instructed to call if she needs any assistance.  Call light within reach.  Pt verbalized understanding.

## 2024-09-27 NOTE — PROGRESS NOTES
[facility]  Obstetrics  Postpartum Progress Note    Patient Name: Moraima Gao  MRN: [id number]  Admission Date: 2024  Hospital Length of Stay: 1 days  Attending Physician: Aaron Devi III, MD  Primary Care Provider: Marifer, Primary Doctor    Subjective:     Principal Problem:Term pregnancy    Hospital Course:  2024:  complicated by nuchal and body cord, 10lb 7 oz  Postpartum  day # 1.  Doing well.  breast and bottle Feeding.  Pain well controlled.  minimal lochia.  Ambulating and voiding without difficulty.  Tolerating a regular diet without nausea or vomiting.         she is doing well this morning. She is tolerating a regular diet without nausea or vomiting. She is voiding spontaneously. She is ambulating. She has passed flatus, and has not a BM. Vaginal bleeding is mild. She denies fever or chills. Abdominal pain is mild and controlled with oral medications. She Is breastfeeding. She desires circumcision for her male baby: yes.    Objective:     Vital Signs (Most Recent):  Temp: 98.4 °F (36.9 °C) (24 1234)  Pulse: 90 (24 1234)  Resp: 18 (24 1241)  BP: 122/72 (24 1234)  SpO2: 99 % (24 1234) Vital Signs (24h Range):  Temp:  [98 °F (36.7 °C)-98.6 °F (37 °C)] 98.4 °F (36.9 °C)  Pulse:  [] 90  Resp:  [16-20] 18  SpO2:  [86 %-100 %] 99 %  BP: (119-151)/(61-94) 122/72     Weight: 123.5 kg (272 lb 4.3 oz)  Body mass index is 48.23 kg/m².      Intake/Output Summary (Last 24 hours) at 2024 1324  Last data filed at 2024 0430  Gross per 24 hour   Intake 470.82 ml   Output 3619 ml   Net -3148.18 ml         Significant Labs:  Lab Results   Component Value Date    GROUPTRH B POS 2024    HEPBSAG Non-reactive 2024     Recent Labs   Lab 24  0601   HGB 11.2*   HCT 34.8*       I have personallly reviewed all pertinent lab results from the last 24 hours.    Physical Exam:   Constitutional: She is oriented to person, place, and time. She  appears well-developed. No distress.    HENT:   Head: Normocephalic and atraumatic.    Eyes: EOM are normal.     Cardiovascular:  Normal rate.             Pulmonary/Chest: Effort normal.        Abdominal: Soft. She exhibits no distension and no mass (fundus firm and below the umbilicus). There is no abdominal tenderness.             Musculoskeletal: Normal range of motion.       Neurological: She is oriented to person, place, and time.    Skin: Skin is warm. No rash noted.    Psychiatric: She has a normal mood and affect.       Review of Systems   Constitutional: Negative.    HENT: Negative.     Eyes: Negative.    Respiratory: Negative.     Cardiovascular: Negative.    Gastrointestinal: Negative.    Endocrine: Negative.    Genitourinary: Negative.    Musculoskeletal: Negative.    Integumentary:  Negative.   Neurological: Negative.    Hematological: Negative.    Psychiatric/Behavioral: Negative.     Breast: negative.      Assessment/Plan:     29 y.o. female  for:     (spontaneous vaginal delivery)  - routine postpartum care.        Disposition: As patient meets milestones, will plan to discharge tomorrow.    Talisha Coker MD  Obstetrics  [facility]

## 2024-09-27 NOTE — PLAN OF CARE
VSS, breastfeeding on demand, encouraged 8 times in 24 hours, wearing supportive bra. Fundus and lochia WDL. Voiding, passing flatus, ambulating and tolerating regular diet well. Bonding well with baby. Pain being managed with motrin and Percolone as needed. Stated an understanding to POC with use of [place] Creole ..

## 2024-09-28 VITALS
SYSTOLIC BLOOD PRESSURE: 123 MMHG | BODY MASS INDEX: 48.24 KG/M2 | WEIGHT: 272.25 LBS | DIASTOLIC BLOOD PRESSURE: 79 MMHG | OXYGEN SATURATION: 98 % | HEIGHT: 63 IN | HEART RATE: 82 BPM | TEMPERATURE: 98 F | RESPIRATION RATE: 18 BRPM

## 2024-09-28 PROCEDURE — 63600175 PHARM REV CODE 636 W HCPCS: Performed by: STUDENT IN AN ORGANIZED HEALTH CARE EDUCATION/TRAINING PROGRAM

## 2024-09-28 PROCEDURE — 25000003 PHARM REV CODE 250: Performed by: STUDENT IN AN ORGANIZED HEALTH CARE EDUCATION/TRAINING PROGRAM

## 2024-09-28 PROCEDURE — 3E02340 INTRODUCTION OF INFLUENZA VACCINE INTO MUSCLE, PERCUTANEOUS APPROACH: ICD-10-PCS | Performed by: OBSTETRICS & GYNECOLOGY

## 2024-09-28 PROCEDURE — 90471 IMMUNIZATION ADMIN: CPT | Performed by: STUDENT IN AN ORGANIZED HEALTH CARE EDUCATION/TRAINING PROGRAM

## 2024-09-28 PROCEDURE — 90656 IIV3 VACC NO PRSV 0.5 ML IM: CPT | Performed by: STUDENT IN AN ORGANIZED HEALTH CARE EDUCATION/TRAINING PROGRAM

## 2024-09-28 PROCEDURE — 99238 HOSP IP/OBS DSCHRG MGMT 30/<: CPT | Mod: ,,, | Performed by: OBSTETRICS & GYNECOLOGY

## 2024-09-28 RX ORDER — DOCUSATE SODIUM 100 MG/1
100 CAPSULE, LIQUID FILLED ORAL 2 TIMES DAILY
Qty: 60 CAPSULE | Refills: 2 | Status: SHIPPED | OUTPATIENT
Start: 2024-09-28 | End: 2025-09-28

## 2024-09-28 RX ORDER — IBUPROFEN 600 MG/1
600 TABLET ORAL EVERY 6 HOURS PRN
Qty: 30 TABLET | Refills: 0 | Status: SHIPPED | OUTPATIENT
Start: 2024-09-28

## 2024-09-28 RX ADMIN — IBUPROFEN 600 MG: 600 TABLET ORAL at 05:09

## 2024-09-28 RX ADMIN — ACETAMINOPHEN 650 MG: 325 TABLET ORAL at 12:09

## 2024-09-28 RX ADMIN — ACETAMINOPHEN 650 MG: 325 TABLET ORAL at 05:09

## 2024-09-28 RX ADMIN — IBUPROFEN 600 MG: 600 TABLET ORAL at 12:09

## 2024-09-28 RX ADMIN — PRENATAL VITAMINS-IRON FUMARATE 27 MG IRON-FOLIC ACID 0.8 MG TABLET 1 TABLET: at 09:09

## 2024-09-28 RX ADMIN — INFLUENZA VIRUS VACCINE 0.5 ML: 15; 15; 15 SUSPENSION INTRAMUSCULAR at 01:09

## 2024-09-28 NOTE — PLAN OF CARE
With use of [place] Creole , patient discharged per order. VS stable with no signs of distress. Discharge instructions reviewed with patient. Reviewed urgent maternal warning signs and instructed to go to ER or call physician if symptoms occur. Reviewed signs and symptoms of depression and anxiety and provided relevant handouts. Reviewed community resources available. Instructed on follow-up appointment with physician. Patient voiced understanding of all via Iraqi creole , patient does speak and understands some English..

## 2024-09-28 NOTE — DISCHARGE SUMMARY
[facility]  Obstetrics  Discharge Summary      Patient Name: Moraima Gao  MRN: [id number]  Admission Date: 2024  Hospital Length of Stay: 2 days  Discharge Date and Time:  2024 7:36 AM  Attending Physician: Aaron Finney III, MD   Discharging Provider: Mike Flores MD   Primary Care Provider: Marifer Primary Doctor    HPI: 30 yo  at 40w4d presents for management of latent labor at term, no LOF        * No surgery found *     Hospital Course:   2024:  complicated by nuchal and body cord, 10lb 7 oz  Postpartum  day # 1.  Doing well.  breast and bottle Feeding.  Pain well controlled.  minimal lochia.  Ambulating and voiding without difficulty.  Tolerating a regular diet without nausea or vomiting.   24. PPD # 2. Pt underwent a routine postpartum course.  Her lochia was appropriate.  And pain is controlled with medication.  Her vital signs were stable and she remained afebrile.  Pt was discharged to home in stable condition.         Consults (From admission, onward)          Status Ordering Provider     Inpatient consult to Lactation  Once        Provider:  (Not yet assigned)    Acknowledged AARON FINNEY III     Inpatient consult to Anesthesiology  Once        Provider:  (Not yet assigned)    Acknowledged AARON FINNEY III            Final Active Diagnoses:    Diagnosis Date Noted POA    PRINCIPAL PROBLEM:  Term pregnancy [Z34.90] 2024 Not Applicable     (spontaneous vaginal delivery) [O80] 2024 Not Applicable      Problems Resolved During this Admission:        Significant Diagnostic Studies: Labs: CBC   Recent Labs   Lab 24  0850 24  0601   WBC 5.79 8.04   HGB 12.4 11.2*   HCT 37.3 34.8*    173         Feeding Method: both breast and bottle    Immunizations       Date Immunization Status Dose Route/Site Given by    24 1644 MMR Incomplete 0.5 mL Subcutaneous/     24 1644 Tdap Incomplete 0.5 mL Intramuscular/     24 1234  "Influenza - Trivalent - Fluarix, Flulaval, Fluzone, Afluria - PF Incomplete 0.5 mL Intramuscular/             Delivery:    Episiotomy: None   Lacerations: None   Repair suture:     Repair # of packets: 1   Blood loss (ml):       Birth information:  YOB: 2023   Time of birth: 3:27 PM   Sex: male   Delivery type: Vaginal, Spontaneous   Gestational Age: 40w4d     Measurements    Weight: 4730 g  Weight (lbs): 10 lb 6.8 oz  Length: 57.8 cm  Length (in): 22.75"         Delivery Clinician: Delivery Providers    Delivering clinician: Aaron eDvi III, MD   Provider Role    Roxanne Adrian RN Delivery Nurse    Edith Shuklaub Person    Joanne Amor RN Registered Nurse    Marly Gale RN NICU [place], Aimee B. NNP Nurse Practitioner    Marcelle Kendrick, RRT Respiratory Therapist             Additional  information:  Forceps:    Vacuum:    Breech:    Observed anomalies      Living?:     Apgars    Living status: Living  Apgar Component Scores:  1 min.:  5 min.:  10 min.:  15 min.:  20 min.:    Skin color:  1  1       Heart rate:  2  2       Reflex irritability:  2  2       Muscle tone:  1  2       Respiratory effort:  1  2       Total:  7  9       Apgars assigned by: ANMOL VASQUEZ         Placenta: Delivered:       appearance  Pending Diagnostic Studies:       None            Discharged Condition: good    Disposition: Home or Self Care    Follow Up:   Follow-up Information       Aaron Deiv III, MD Follow up in 6 week(s).    Specialty: Obstetrics and Gynecology  Contact information:  [address] [place] [address] [phone]                           Patient Instructions:      BREAST PUMP FOR HOME USE     Order Specific Question Answer Comments   Type of pump: Electric    Weight: 123.5 kg (272 lb 4.3 oz)    Length of need (1-99 months): 99      Diet Adult Regular     Pelvic Rest     Notify your health care provider if you experience any of the following:  " increased confusion or weakness     Notify your health care provider if you experience any of the following:  persistent dizziness, light-headedness, or visual disturbances     Notify your health care provider if you experience any of the following:  worsening rash     Notify your health care provider if you experience any of the following:  severe persistent headache     Notify your health care provider if you experience any of the following:  difficulty breathing or increased cough     Notify your health care provider if you experience any of the following:  redness, tenderness, or signs of infection (pain, swelling, redness, odor or green/yellow discharge around incision site)     Notify your health care provider if you experience any of the following:  severe uncontrolled pain     Notify your health care provider if you experience any of the following:  persistent nausea and vomiting or diarrhea     Notify your health care provider if you experience any of the following:  temperature >100.4     Activity as tolerated     Medications:  Current Discharge Medication List        START taking these medications    Details   docusate sodium (COLACE) 100 MG capsule Take 1 capsule (100 mg total) by mouth 2 (two) times daily.  Qty: 60 capsule, Refills: 2      ibuprofen (ADVIL,MOTRIN) 600 MG tablet Take 1 tablet (600 mg total) by mouth every 6 (six) hours as needed for Pain.  Qty: 30 tablet, Refills: 0           CONTINUE these medications which have NOT CHANGED    Details   famotidine (PEPCID) 20 MG tablet Take 1 tablet (20 mg total) by mouth once daily.  Qty: 30 tablet, Refills: 2    Associated Diagnoses: Gastroesophageal reflux disease, unspecified whether esophagitis present      prenatal 21-iron fu-folic acid (PRENATAL COMPLETE) 14 mg iron- 400 mcg Tab Take 1 tablet by mouth once daily.  Qty: 30 tablet, Refills: 0      acetaminophen (TYLENOL) 500 MG tablet Take 1 tablet (500 mg total) by mouth every 6 (six) hours as  needed for Pain.  Qty: 30 tablet, Refills: 0    Associated Diagnoses: Pregnancy headache in third trimester           STOP taking these medications       promethazine (PHENERGAN) 25 MG tablet Comments:   Reason for Stopping:               Mike Flores MD  Obstetrics  [facility]

## 2024-09-28 NOTE — LACTATION NOTE
09/28/24 0900   Maternal Assessment   Breast Density Bilateral:;soft   Areola Bilateral:;dense   Nipples Bilateral:;everted   Left Nipple Symptoms tender   Maternal Infant Feeding   Maternal Emotional State assist needed;relaxed   Pain with Feeding yes   Pain Location nipple, left   Pain Description soreness   Latch Assistance yes     Patient will be discharged this afternoon.  She is breast and formula feeding her infant.  Has only formula fed during the night.  Using  #[id number], discussed placing infant to breast before supplementing.  Patient states she does have pain on the left side when latching.  Encouraged patient to call for assistance for latch, explained that pain indicates that the latch is shallow.  Verbalized understanding. Discussed infants voiding and stool pattern and what stool should look like if exclusively breastfeeding or giving formula.  Instructed patient to continue taking prenatal vitamins and that some medications may cross into breast milk and to check with her physician or pharmacist before taking any medication.  Discussed signs and symptoms of engorgement and mastitis and when to call the physician.  Instructed patient that the baby should only receive breast milk or formula for the first 6 months.  No water or juice.  Instructed the patient to feed the baby or pump 8 or more times in 24 hours.  Discussed pumping and how to store EBM.  Discussed how to thaw/warm EBM. Verbalized understanding.  All questions answered.  Lactation discharge instructions completed.

## 2024-09-28 NOTE — PLAN OF CARE
Plan of care reviewed. Fundus firm. Bonding with infant. Frequent checks made to ensure safety and comfort. Scheduled Tylenol and Motrin effective for cramping and perineal pain. Bed low, call bell within reach.

## 2024-09-28 NOTE — PLAN OF CARE
Problem: Breastfeeding  Goal: Effective Breastfeeding  Outcome: Met  Intervention: Promote Breast Care and Comfort  Flowsheets (Taken 9/28/2024 0900)  Breast Care: Breastfeeding: open to air  Intervention: Promote Effective Breastfeeding  Flowsheets (Taken 9/28/2024 0900)  Breastfeeding Assistance:   feeding on demand promoted   feeding cue recognition promoted   supplemental feeding provided  Parent-Child Attachment Promotion:   caring behavior modeled   cue recognition promoted   face-to-face positioning promoted   interaction encouraged   parent/caregiver presence encouraged   strengths emphasized   skin-to-skin contact encouraged   rooming-in promoted   positive reinforcement provided   participation in care promoted  Intervention: Support Exclusive Breastfeeding Success  Flowsheets (Taken 9/28/2024 0900)  Supportive Measures:   active listening utilized   counseling provided   decision-making supported   goal-setting facilitated   self-care encouraged   problem-solving facilitated   positive reinforcement provided   self-responsibility promoted   verbalization of feelings encouraged  Breastfeeding Support:   encouragement provided   infant-mother separation minimized   lactation counseling provided   maternal hydration promoted   maternal nutrition promoted   maternal rest encouraged

## 2024-09-29 ENCOUNTER — PATIENT MESSAGE (OUTPATIENT)
Dept: LACTATION | Facility: CLINIC | Age: 29
End: 2024-09-29
Payer: MEDICAID

## 2024-11-08 ENCOUNTER — POSTPARTUM VISIT (OUTPATIENT)
Dept: OBSTETRICS AND GYNECOLOGY | Facility: CLINIC | Age: 29
End: 2024-11-08
Payer: MEDICAID

## 2024-11-08 ENCOUNTER — CLINICAL SUPPORT (OUTPATIENT)
Dept: OBSTETRICS AND GYNECOLOGY | Facility: CLINIC | Age: 29
End: 2024-11-08
Payer: MEDICAID

## 2024-11-08 VITALS
WEIGHT: 249.44 LBS | HEIGHT: 63 IN | DIASTOLIC BLOOD PRESSURE: 72 MMHG | SYSTOLIC BLOOD PRESSURE: 120 MMHG | BODY MASS INDEX: 44.2 KG/M2

## 2024-11-08 DIAGNOSIS — Z30.42 ENCOUNTER FOR MANAGEMENT AND INJECTION OF DEPO-PROVERA: Primary | ICD-10-CM

## 2024-11-08 DIAGNOSIS — Z30.09 ENCOUNTER FOR COUNSELING REGARDING CONTRACEPTION: ICD-10-CM

## 2024-11-08 LAB
B-HCG UR QL: NEGATIVE
CTP QC/QA: YES

## 2024-11-08 PROCEDURE — 99999 PR PBB SHADOW E&M-EST. PATIENT-LVL I: CPT | Mod: PBBFAC,,,

## 2024-11-08 PROCEDURE — 99213 OFFICE O/P EST LOW 20 MIN: CPT | Mod: PBBFAC | Performed by: STUDENT IN AN ORGANIZED HEALTH CARE EDUCATION/TRAINING PROGRAM

## 2024-11-08 PROCEDURE — 99999 PR PBB SHADOW E&M-EST. PATIENT-LVL III: CPT | Mod: PBBFAC,,, | Performed by: STUDENT IN AN ORGANIZED HEALTH CARE EDUCATION/TRAINING PROGRAM

## 2024-11-08 RX ORDER — MEDROXYPROGESTERONE ACETATE 150 MG/ML
150 INJECTION, SUSPENSION INTRAMUSCULAR
Status: SHIPPED | OUTPATIENT
Start: 2024-11-08 | End: 2025-10-10

## 2024-11-08 RX ADMIN — MEDROXYPROGESTERONE ACETATE 150 MG: 150 INJECTION, SUSPENSION INTRAMUSCULAR at 10:11

## 2024-11-08 NOTE — PROGRESS NOTES
Depo-provera injection administered per MAR without difficutly. Pt instructed to sit in waiting room for 15 minutes to monitor for s/s of adverse reaction. Next appointment made, stressed importance of staying within manish period. Pt verb understanding

## 2024-11-08 NOTE — PROGRESS NOTES
Subjective     Patient ID: Moraima Gao is a 29 y.o. female.    Chief Complaint:  Postpartum Care (Pap needed.)      History of Present Illness  30 yo  presents for PP visit    Bleeding has lightened, has not resumed menses yet. Currently breast and bottle feeding  No issues with BM or voiding, no issues tolerating PO  Has not resumed SA yet, would like to get on birth control. Used DMPA before     [facility] Creole #[id number]        GYN & OB History  Patient's last menstrual period was 2023 (exact date).   Date of Last Pap: 2024    OB History    Para Term  AB Living   3 2 2   1 2   SAB IAB Ectopic Multiple Live Births   1     0 2      # Outcome Date GA Lbr Kaushal/2nd Weight Sex Type Anes PTL Lv   3 Term 24 40w4d 02:43 / 00:24 4.73 kg (10 lb 6.8 oz) M Vag-Spont EPI N EUGNEIA   2 Term 19   3.58 kg (7 lb 14.3 oz) M Vag-Spont      1 SAB      SAB   FD       Review of Systems  Review of Systems   All other systems reviewed and are negative.         Objective   Physical Exam:   Constitutional: She appears well-developed and well-nourished.    HENT:   Head: Normocephalic and atraumatic.    Eyes: EOM are normal.      Pulmonary/Chest: Effort normal.        Abdominal: Soft.     Genitourinary:    Uterus, right adnexa and left adnexa normal.      Pelvic exam was performed with patient in the lithotomy position.   The external female genitalia was normal.   Genitalia hair distrobution normal .   There is no lesion on the right labia. There is no lesion on the left labia. Cervix is normal. There is vaginal discharge in the vagina.    pap smear completed   Genitourinary Comments: Female chaperone present for duration of exam             Musculoskeletal: Normal range of motion.       Neurological: She is alert.    Skin: Skin is warm and dry.    Psychiatric: She has a normal mood and affect.         Recent Labs   Lab Result Units 24  1101   POC Preg Test, Ur  Negative          Assessment and  Plan     1. Postpartum care and examination    2. Encounter for counseling regarding contraception          Plan:  1. Postpartum care and examination (Primary)  - Pap done today.  - Screening tests as ordered.  - Reviewed ASCCP Pap guidelines and screening recommendations.    Counseling: Contraception:Depo-Provera and OCP continuous  Health Screens: Health Maintenance reviewed  - Liquid-Based Pap Smear, Screening    2. Encounter for counseling regarding contraception  - provided birth control option sheet in [facility] Creole  - pt would like to re-start on depo-provera  - POCT Urine Pregnancy      Follow up in 1 year or sooner as needed     Aaron Devi III, MD  [facility] - OB GYN   [address]  [place] [id number] [phone]

## 2024-11-14 ENCOUNTER — TELEPHONE (OUTPATIENT)
Dept: OBSTETRICS AND GYNECOLOGY | Facility: CLINIC | Age: 29
End: 2024-11-14
Payer: MEDICAID

## 2024-11-14 NOTE — TELEPHONE ENCOUNTER
----- Message from Kim Cooper PA-C sent at 11/14/2024  2:14 PM CST -----  Can you call her and let her know her pap smear was normal

## 2024-11-22 ENCOUNTER — PATIENT MESSAGE (OUTPATIENT)
Dept: RESEARCH | Facility: HOSPITAL | Age: 29
End: 2024-11-22
Payer: MEDICAID

## 2025-01-31 ENCOUNTER — CLINICAL SUPPORT (OUTPATIENT)
Dept: OBSTETRICS AND GYNECOLOGY | Facility: CLINIC | Age: 30
End: 2025-01-31
Payer: MEDICAID

## 2025-01-31 DIAGNOSIS — Z30.42 ENCOUNTER FOR MANAGEMENT AND INJECTION OF DEPO-PROVERA: Primary | ICD-10-CM

## 2025-01-31 PROCEDURE — 99999PBSHW PR PBB SHADOW TECHNICAL ONLY FILED TO HB: Mod: PBBFAC,,,

## 2025-01-31 PROCEDURE — 96372 THER/PROPH/DIAG INJ SC/IM: CPT | Mod: PBBFAC

## 2025-01-31 RX ADMIN — MEDROXYPROGESTERONE ACETATE 150 MG: 150 INJECTION, SUSPENSION INTRAMUSCULAR at 03:01

## 2025-01-31 NOTE — PROGRESS NOTES
Depo-provera injection administered without difficutly. Pt instructed to sit in waiting room for 15 minutes to monitor for s/s of adverse reaction. Next appointment made, stressed importance of staying within manish period. Pt verb understanding.